# Patient Record
Sex: FEMALE | Race: BLACK OR AFRICAN AMERICAN | Employment: UNEMPLOYED | URBAN - METROPOLITAN AREA
[De-identification: names, ages, dates, MRNs, and addresses within clinical notes are randomized per-mention and may not be internally consistent; named-entity substitution may affect disease eponyms.]

---

## 2017-03-24 ENCOUNTER — TELEPHONE (OUTPATIENT)
Dept: FAMILY MEDICINE CLINIC | Age: 42
End: 2017-03-24

## 2017-03-24 ENCOUNTER — OFFICE VISIT (OUTPATIENT)
Dept: FAMILY MEDICINE CLINIC | Age: 42
End: 2017-03-24

## 2017-03-24 VITALS — BODY MASS INDEX: 30.62 KG/M2 | HEIGHT: 68 IN | WEIGHT: 202 LBS

## 2017-03-24 DIAGNOSIS — E11.9 TYPE 2 DIABETES MELLITUS WITHOUT COMPLICATION, WITHOUT LONG-TERM CURRENT USE OF INSULIN (HCC): ICD-10-CM

## 2017-03-24 DIAGNOSIS — Z01.419 WELL WOMAN EXAM WITH ROUTINE GYNECOLOGICAL EXAM: Primary | ICD-10-CM

## 2017-03-24 DIAGNOSIS — N81.2 INCOMPLETE UTERINE PROLAPSE: ICD-10-CM

## 2017-03-24 NOTE — PROGRESS NOTES
Subjective:   46 y.o. female for Well Woman Check. No LMP recorded. LMP 3/10/17 lasting for 7 days. Regular menses. Denies any vaginal discharge, itching or odor. She does note some urinary incontinence. S/p  x 2-  1. 6 lbs +  2. 6 lbs +  Not currently sexually active for 4 yrs. Denies any concern for STIs. Denies any hx of abnormal pap smears. Social History: not sexually active. Pertinent past medical hstory: hypertension, type 2 DM. Denies any smoking. Denies any drinking or drugs. FHx neg for malignancy. Patient Active Problem List   Diagnosis Code    Allergic rhinitis J30.9    HTN (hypertension) I10    Diabetes mellitus ([facility] E11.9    Obesity E66.9     Patient Active Problem List    Diagnosis Date Noted    Obesity 2016    Allergic rhinitis     HTN (hypertension)     [facility]      Current Outpatient Prescriptions   Medication Sig Dispense Refill    hydroCHLOROthiazide (MICROZIDE) 12.5 mg capsule TAKE ONE CAPSULE BY MOUTH EVERY DAY 30 Cap 0    metFORMIN (GLUCOPHAGE) 500 mg tablet TAKE 1 TABLET BY MOUTH TWICE DAILY WITH MEALS 180 Tab 0    fluticasone (FLONASE) 50 mcg/actuation nasal spray 1 spray in each nostril daily PRn 1 Bottle 0    glucose blood VI test strips (BLOOD GLUCOSE TEST) strip Check blood sugar before breakfast and 2 hours after lunch (heaviest meal of day) 50 Strip 11    naproxen (NAPROSYN) 500 mg tablet Take 1 Tab by mouth two (2) times daily (with meals). 30 Tab 0    Blood-Glucose Meter monitoring kit Check blood sugar before breakfast and 2 hours after lunch (heaviest meal of day)  Reli on prefared 1 Kit 0    cetirizine (ZYRTEC) 5 mg tablet Take 1 Tab by mouth daily. 30 Tab 2    cyclobenzaprine (FLEXERIL) 5 mg tablet Take 1 Tab by mouth three (3) times daily as needed for Muscle Spasm(s).  39 Tab 0     No Known Allergies  Past Medical History:   Diagnosis Date    Allergic rhinitis     Diabetes mellitus ([facility]     HTN (hypertension) Diagnosed in ER in 1/2016 - on HCTZ - checking ambulatory BPs     History reviewed. No pertinent surgical history. Social History   Substance Use Topics    Smoking status: Never Smoker    Smokeless tobacco: Not on file    Alcohol use No        ROS:  Feeling well. No dyspnea or chest pain on exertion. No abdominal pain, change in bowel habits, black or bloody stools. No urinary tract symptoms. GYN ROS: normal menses, no abnormal bleeding, pelvic pain or discharge, no breast pain or new or enlarging lumps on self exam. No neurological complaints. Objective:     Visit Vitals    Ht 5' 8\" (1.727 m)    Wt 202 lb (91.6 kg)    BMI 30.71 kg/m2     The patient appears well, alert, oriented x 3, in no distress. ENT normal.  Neck supple. No adenopathy or thyromegaly. BING. Lungs are clear, good air entry, no wheezes, rhonchi or rales. S1 and S2 normal, no murmurs, regular rate and rhythm. Abdomen soft without tenderness, guarding, mass or organomegaly. Extremities show no edema, normal peripheral pulses. Neurological is normal, no focal findings. BREAST EXAM: breasts appear normal, no suspicious masses, no skin or nipple changes or axillary nodes, symmetric fibrous changes in both upper outer quadrants    PELVIC EXAM: VULVA: normal appearing vulva with no masses, tenderness or lesions, VAGINA: normal appearing vagina with normal color and discharge, no lesions, CERVIX: +uterine prolapse (mod to severe) of a large part of the lower uterine segment and cervix prolapsing through the vaginal opening, UTERUS: uterus is normal size, shape, consistency and nontender, ADNEXA: normal adnexa in size, nontender and no masses, PAP: Pap smear done today, exam chaperoned by LPN    Assessment/Plan:   well woman  pap smear  additional lab tests per orders  return annually or prn    ICD-10-CM ICD-9-CM    1. Well woman exam with routine gynecological exam Z01.419 V72.31 PAP, HPV HIGH RISK      CBC W/O DIFF   2.  Type 2 diabetes mellitus without complication, without long-term current use of insulin ([facility]) C73.6 037.51 METABOLIC PANEL, COMPREHENSIVE      HEMOGLOBIN A1C WITH EAG      LIPID PANEL      AMB POC URINE, MICROALBUMIN, SEMIQUANT (3 RESULTS)       DIABETES FOOT EXAM   3. Incomplete uterine prolapse N81.2 618.2 REFERRAL TO OBSTETRICS AND GYNECOLOGY   .

## 2017-03-24 NOTE — TELEPHONE ENCOUNTER
Patient called to office to report she left paperwork that doctor gave her from her visit in restroom [facility]. Paperwork retrieved by staff member who happened to use that restroom. Informed patient and patient thanked me for my help. Patient ask that I mail her the paperwork she left, which included After Visit Summary/Orders etc.    Verified address on file with patient and patient verified correct.   Address    [address] [facility]  Address     ZIP code    [address] [place] [address]

## 2017-03-24 NOTE — MR AVS SNAPSHOT
Visit Information Date & Time Provider Department Dept. Phone Encounter #  
 3/24/2017  2:00 PM Dickson Moseley, [address] [facility] [phone] [id number] Follow-up Instructions Return in about 6 months (around 9/24/2017). Upcoming Health Maintenance Date Due  
 PAP AKA CERVICAL CYTOLOGY 7/8/2016 FOOT EXAM Q1 2/4/2017 HEMOGLOBIN A1C Q6M 2/28/2017 EYE EXAM RETINAL OR DILATED Q1 10/10/2018* MICROALBUMIN Q1 5/13/2017 LIPID PANEL Q1 8/30/2017 DTaP/Tdap/Td series (2 - Td) 5/13/2026 *Topic was postponed. The date shown is not the original due date. Allergies as of 3/24/2017  Review Complete On: 3/24/2017 By: Dickson Moseley MD  
 No Known Allergies Current Immunizations  Never Reviewed No immunizations on file. Not reviewed this visit You Were Diagnosed With   
  
 Codes Comments Well woman exam with routine gynecological exam    -  Primary ICD-10-CM: L12.242 ICD-9-CM: V72.31 Type 2 diabetes mellitus without complication, without long-term current use of insulin (HCC)     ICD-10-CM: E11.9 ICD-9-CM: 250.00 Incomplete uterine prolapse     ICD-10-CM: N81.2 ICD-9-CM: 618.2 Vitals Height(growth percentile) Weight(growth percentile) BMI OB Status Smoking Status 5' 8\" (1.727 m) 202 lb (91.6 kg) 30.71 kg/m2 Having regular periods Never Smoker BMI and BSA Data Body Mass Index Body Surface Area 30.71 kg/m 2 2.1 m 2 Preferred Pharmacy Pharmacy Name Phone [facility] [phone] [facility] [place], [address] [facility] [phone] Your Updated Medication List  
  
   
This list is accurate as of: 3/24/17  2:48 PM.  Always use your most recent med list.  
  
  
  
  
 Blood-Glucose Meter monitoring kit Check blood sugar before breakfast and 2 hours after lunch (heaviest meal of day) Reli on prefared  
  
 cetirizine 5 mg tablet Commonly known as:  ZYRTEC Take 1 Tab by mouth daily. cyclobenzaprine 5 mg tablet Commonly known as:  FLEXERIL Take 1 Tab by mouth three (3) times daily as needed for Muscle Spasm(s). fluticasone 50 mcg/actuation nasal spray Commonly known as:  FLONASE  
1 spray in each nostril daily PRn  
  
 glucose blood VI test strips strip Commonly known as:  blood glucose test  
Check blood sugar before breakfast and 2 hours after lunch (heaviest meal of day)  
  
 hydroCHLOROthiazide 12.5 mg capsule Commonly known as:  Ferdie Cea TAKE ONE CAPSULE BY MOUTH EVERY DAY  
  
 metFORMIN 500 mg tablet Commonly known as:  GLUCOPHAGE  
TAKE 1 TABLET BY MOUTH TWICE DAILY WITH MEALS  
  
 naproxen 500 mg tablet Commonly known as:  NAPROSYN Take 1 Tab by mouth two (2) times daily (with meals). We Performed the Following  DIABETES FOOT EXAM [7 Custom] PAP, HPV HIGH RISK [88124 CPT(R)] REFERRAL TO OBSTETRICS AND GYNECOLOGY [REF51 Custom] Comments:  
 Please evaluate patient for uterine prolapse, incomplete. Follow-up Instructions Return in about 6 months (around 9/24/2017). To-Do List   
 03/30/2017 Point of Care Testing:  AMB POC URINE, MICROALBUMIN, SEMIQUANT (3 RESULTS)   
  
 03/30/2017 Lab:  CBC W/O DIFF   
  
 03/30/2017 Lab:  HEMOGLOBIN A1C WITH EAG   
  
 03/30/2017 Lab:  LIPID PANEL   
  
 03/30/2017 Lab:  METABOLIC PANEL, COMPREHENSIVE Referral Information Referral ID Referred By Referred To  
  
 [facility], Soni Ellison MD   
   [address] [place] [address] Phone: [phone] Fax: [phone] Visits Status Start Date End Date 1 New Request 3/24/17 3/24/18 If your referral has a status of pending review or denied, additional information will be sent to support the outcome of this decision. Patient Instructions A Healthy Heart: Care Instructions Your Care Instructions Heart disease occurs when a substance called plaque builds up in the vessels that supply oxygen-rich blood to your heart. This can narrow the blood vessels and reduce blood flow. A heart attack happens when blood flow is completely blocked. A high-fat diet, smoking, and other factors increase the risk of heart disease. Your doctor has found that you have a chance of having heart disease. You can do lots of things to keep your heart healthy. It may not be easy, but you can change your diet, exercise more, and quit smoking. These steps really work to lower your chance of heart disease. Follow-up care is a key part of your treatment and safety. Be sure to make and go to all appointments, and call your doctor if you are having problems. It's also a good idea to know your test results and keep a list of the medicines you take. How can you care for yourself at home? Diet · Use less salt when you cook and eat. This helps lower your blood pressure. Taste food before salting. Add only a little salt when you think you need it. With time, your taste buds will adjust to less salt. · Eat fewer snack items, fast foods, canned soups, and other high-salt, high-fat, processed foods. · Read food labels and try to avoid saturated and trans fats. They increase your risk of heart disease by raising cholesterol levels. · Limit the amount of solid fat-butter, margarine, and shortening-you eat. Use olive, peanut, or canola oil when you cook. Bake, broil, and steam foods instead of frying them. · Eating fish can lower your risk for heart disease. Eat at least 2 servings of fish a week. Macy, mackerel, herring, sardines, and chunk light tuna are very good choices. These fish contain omega-3 fatty acids. · Eat a variety of fruit and vegetables every day.  Dark green, deep orange, red, or yellow fruits and vegetables are especially good for you. Examples include spinach, carrots, peaches, and berries. · Foods high in fiber can reduce your cholesterol and provide important vitamins and minerals. High-fiber foods include whole-grain cereals and breads, oatmeal, beans, brown rice, citrus fruits, and apples. · Limit drinks and foods with added sugar. These include candy, desserts, and soda pop. Lifestyle changes · If your doctor recommends it, get more exercise. Walking is a good choice. Bit by bit, increase the amount you walk every day. Try for at least 30 minutes on most days of the week. You also may want to swim, bike, or do other activities. · Do not smoke. If you need help quitting, talk to your doctor about stop-smoking programs and medicines. These can increase your chances of quitting for good. Quitting smoking may be the most important step you can take to protect your heart. It is never too late to quit. You will get health benefits right away. · Limit alcohol to 2 drinks a day for men and 1 drink a day for women. Too much alcohol can cause health problems. Medicines · Take your medicines exactly as prescribed. Call your doctor if you think you are having a problem with your medicine. · If your doctor recommends aspirin, take the amount directed each day. Make sure you take aspirin and not another kind of pain reliever, such as acetaminophen (Tylenol). If you take ibuprofen (such as Advil or Motrin) for other problems, take aspirin at least 2 hours before taking ibuprofen. When should you call for help? Call 911 if you have symptoms of a heart attack. These may include: 
· You have chest pain or pressure. This may occur with: 
¨ Chest pain or pressure, or a strange feeling in the chest. 
¨ Sweating. ¨ Shortness of breath. ¨ Pain, pressure, or a strange feeling in the back, neck, jaw, or upper belly or in one or both shoulders or arms. ¨ Lightheadedness or sudden weakness. ¨ A fast or irregular heartbeat. After you call 911, the  may tell you to chew 1 adult-strength or 2 to 4 low-dose aspirin. Wait for an ambulance. Do not try to drive yourself. Watch closely for changes in your health, and be sure to contact your doctor if: 
· Your symptoms are slowly getting worse. · You do not get better as expected. Where can you learn more? Go to http://shayla-michael.info/. Enter K903 in the search box to learn more about \"A Healthy Heart: Care Instructions. \" Current as of: January 27, 2016 Content Version: 11.1 © 7299-0210 [employer]. Care instructions adapted under license by [employer] (which disclaims liability or warranty for this information). If you have questions about a medical condition or this instruction, always ask your healthcare professional. [place] [address] any warranty or liability for your use of this information. Heart-Healthy Diet: Care Instructions Your Care Instructions A heart-healthy diet has lots of vegetables, fruits, nuts, beans, and whole grains, and is low in salt. It limits foods that are high in saturated fat, such as meats, cheeses, and fried foods. It may be hard to change your diet, but even small changes can lower your risk of heart attack and heart disease. Follow-up care is a key part of your treatment and safety. Be sure to make and go to all appointments, and call your doctor if you are having problems. It's also a good idea to know your test results and keep a list of the medicines you take. How can you care for yourself at home? Watch your portions · Learn what a serving is. A \"serving\" and a \"portion\" are not always the same thing. Make sure that you are not eating larger portions than are recommended. For example, a serving of pasta is ½ cup.  A serving size of meat is 2 to 3 ounces. A 3-ounce serving is about the size of a deck of cards. Measure serving sizes until you are good at [place]" them. Keep in mind that restaurants often serve portions that are 2 or 3 times the size of one serving. · To keep your energy level up and keep you from feeling hungry, eat often but in smaller portions. · Eat only the number of calories you need to stay at a healthy weight. If you need to lose weight, eat fewer calories than your body burns (through exercise and other physical activity). Eat more fruits and vegetables · Eat a variety of fruit and vegetables every day. Dark green, deep orange, red, or yellow fruits and vegetables are especially good for you. Examples include spinach, carrots, peaches, and berries. · Keep carrots, celery, and other veggies handy for snacks. Buy fruit that is in season and store it where you can see it so that you will be tempted to eat it. · Cook dishes that have a lot of veggies in them, such as stir-fries and soups. Limit saturated and trans fat · Read food labels, and try to avoid saturated and trans fats. They increase your risk of heart disease. Trans fat is found in many processed foods such as cookies and crackers. · Use olive or canola oil when you cook. Try cholesterol-lowering spreads, such as Benecol or Take Control. · Bake, broil, grill, or steam foods instead of frying them. · Choose lean meats instead of high-fat meats such as hot dogs and sausages. Cut off all visible fat when you prepare meat. · Eat fish, skinless poultry, and meat alternatives such as soy products instead of high-fat meats. Soy products, such as tofu, may be especially good for your heart. · Choose low-fat or fat-free milk and dairy products. Eat fish · Eat at least two servings of fish a week. Certain fish, such as salmon and tuna, contain omega-3 fatty acids, which may help reduce your risk of heart attack. Eat foods high in fiber · Eat a variety of grain products every day. Include whole-grain foods that have lots of fiber and nutrients. Examples of whole-grain foods include oats, whole wheat bread, and brown rice. · Buy whole-grain breads and cereals, instead of white bread or pastries. Limit salt and sodium · Limit how much salt and sodium you eat to help lower your blood pressure. · Taste food before you salt it. Add only a little salt when you think you need it. With time, your taste buds will adjust to less salt. · Eat fewer snack items, fast foods, and other high-salt, processed foods. Check food labels for the amount of sodium in packaged foods. · Choose low-sodium versions of canned goods (such as soups, vegetables, and beans). Limit sugar · Limit drinks and foods with added sugar. These include candy, desserts, and soda pop. Limit alcohol · Limit alcohol to no more than 2 drinks a day for men and 1 drink a day for women. Too much alcohol can cause health problems. When should you call for help? Watch closely for changes in your health, and be sure to contact your doctor if: 
· You would like help planning heart-healthy meals. Where can you learn more? Go to http://shayla-michael.info/. Enter V137 in the search box to learn more about \"Heart-Healthy Diet: Care Instructions. \" Current as of: January 27, 2016 Content Version: 11.1 © 4440-5917 [employer]. Care instructions adapted under license by [employer] (which disclaims liability or warranty for this information). If you have questions about a medical condition or this instruction, always ask your healthcare professional. [place] [address] any warranty or liability for your use of this information. Eating Healthy Foods: Care Instructions Your Care Instructions Eating healthy foods can help lower your risk for disease.  Healthy food gives you energy and keeps your heart strong, your brain active, your muscles working, and your bones strong. A healthy diet includes a variety of foods from the basic food groups: grains, vegetables, fruits, milk and milk products, and meat and beans. Some people may eat more of their favorite foods from only one food group and, as a result, miss getting the nutrients they need. So, it is important to pay attention not only to what you eat but also to what you are missing from your diet. You can eat a healthy, balanced diet by making a few small changes. Follow-up care is a key part of your treatment and safety. Be sure to make and go to all appointments, and call your doctor if you are having problems. Its also a good idea to know your test results and keep a list of the medicines you take. How can you care for yourself at home? Look at what you eat · Keep a food diary for a week or two and record everything you eat or drink. Track the number of servings you eat from each food group. · For a balanced diet every day, eat a variety of: ¨ 6 or more ounce-equivalents of grains, such as cereals, breads, crackers, rice, or pasta, every day. An ounce-equivalent is 1 slice of bread, 1 cup of ready-to-eat cereal, or ½ cup of cooked rice, cooked pasta, or cooked cereal. 
¨ 2½ cups of vegetables, especially: § Dark-green vegetables such as broccoli and spinach. § Orange vegetables such as carrots and sweet potatoes. § Dry beans (such as suarez and kidney beans) and peas (such as lentils). ¨ 2 cups of fresh, frozen, or canned fruit. A small apple or 1 banana or orange equals 1 cup. ¨ 3 cups of nonfat or low-fat milk, yogurt, or other milk products. ¨ 5½ ounces of meat and beans, such as chicken, fish, lean meat, beans, nuts, and seeds. One egg, 1 tablespoon of peanut butter, ½ ounce nuts or seeds, or ¼ cup of cooked beans equals 1 ounce of meat. · Learn how to read food labels for serving sizes and ingredients. Fast-food and convenience-food meals often contain few or no fruits or vegetables. Make sure you eat some fruits and vegetables to make the meal more nutritious. · Look at your food diary. For each food group, add up what you have eaten and then divide the total by the number of days. This will give you an idea of how much you are eating from each food group. See if you can find some ways to change your diet to make it more healthy. Start small · Do not try to make dramatic changes to your diet all at once. You might feel that you are missing out on your favorite foods and then be more likely to fail. · Start slowly, and gradually change your habits. Try some of the following: ¨ Use whole wheat bread instead of white bread. ¨ Use nonfat or low-fat milk instead of whole milk. ¨ Eat brown rice instead of white rice, and eat whole wheat pasta instead of white-flour pasta. ¨ Try low-fat cheeses and low-fat yogurt. ¨ Add more fruits and vegetables to meals and have them for snacks. ¨ Add lettuce, tomato, cucumber, and onion to sandwiches. ¨ Add fruit to yogurt and cereal. 
Enjoy food · You can still eat your favorite foods. You just may need to eat less of them. If your favorite foods are high in fat, salt, and sugar, limit how often you eat them, but do not cut them out entirely. · Eat a wide variety of foods. Make healthy choices when eating out · The type of restaurant you choose can help you make healthy choices. Even fast-food chains are now offering more low-fat or healthier choices on the menu. · Choose smaller portions, or take half of your meal home. · When eating out, try: ¨ A veggie pizza with a whole wheat crust or grilled chicken (instead of sausage or pepperoni). ¨ Pasta with roasted vegetables, grilled chicken, or marinara sauce instead of cream sauce. ¨ A vegetable wrap or grilled chicken wrap. ¨ Broiled or poached food instead of fried or breaded items. Make healthy choices easy · Buy packaged, prewashed, ready-to-eat fresh vegetables and fruits, such as baby carrots, salad mixes, and chopped or shredded broccoli and cauliflower. · Buy packaged, presliced fruits, such as melon or pineapple. · Choose 100% fruit or vegetable juice instead of soda. Limit juice intake to 4 to 6 oz (½ to ¾ cup) a day. · Blend low-fat yogurt, fruit juice, and canned or frozen fruit to make a smoothie for breakfast or a snack. Where can you learn more? Go to http://shaylaicanbuymichael.info/. Enter T756 in the search box to learn more about \"Eating Healthy Foods: Care Instructions. \" Current as of: November 20, 2015 Content Version: 11.1 © 6372-0458 [employer]. Care instructions adapted under license by [employer] (which disclaims liability or warranty for this information). If you have questions about a medical condition or this instruction, always ask your healthcare professional. [place] [address] any warranty or liability for your use of this information. Nutrition Tips for Diabetes: After Your Visit Your Care Instructions A healthy diet is important to manage diabetes. It helps you lose weight (if you need to) and keep it off. It gives you the nutrition and energy your body needs and helps prevent heart disease. But a diet for diabetes does not mean that you have to eat special foods. You can eat what your family eats, including occasional sweets and other favorites. But you do have to pay attention to how often you eat and how much you eat of certain foods. The right plan for you will give you meals that help you keep your blood sugar at healthy levels. Try to eat a variety of foods and to spread carbohydrate throughout the day. Carbohydrate raises blood sugar higher and more quickly than any other nutrient does. Carbohydrate is found in sugar, breads and cereals, fruit, starchy vegetables such as potatoes and corn, and milk and yogurt. You may want to work with a dietitian or diabetes educator to help you plan meals and snacks. A dietitian or diabetes educator also can help you lose weight if that is one of your goals. The following tips can help you enjoy your meals and stay healthy. Follow-up care is a key part of your treatment and safety. Be sure to make and go to all appointments, and call your doctor if you are having problems. Its also a good idea to know your test results and keep a list of the medicines you take. How can you care for yourself at home? · Learn which foods have carbohydrate and how much carbohydrate to eat. A dietitian or diabetes educator can help you learn to keep track of how much carbohydrate you eat. · Spread carbohydrate throughout the day. Eat some carbohydrate at all meals, but do not eat too much at any one time. · Plan meals to include food from all the food groups. These are the food groups and some example portion sizes: ¨ Grains: 1 slice of bread (1 ounce), ½ cup of cooked cereal, and 1/3 cup of cooked pasta or rice. These have about 15 grams of carbohydrate in a serving. Choose whole grains such as whole wheat bread or crackers, oatmeal, and brown rice more often than refined grains. ¨ Fruit: 1 small fresh fruit, such as an apple or orange; ½ of a banana; ½ cup of chopped, cooked, or canned fruit; ½ cup of fruit juice; 1 cup of melon or raspberries; and 2 tablespoons of dried fruit. These have about 15 grams of carbohydrate in a serving. ¨ Dairy: 1 cup of nonfat or low-fat milk and 2/3 cup of plain yogurt. These have about 15 grams of carbohydrate in a serving. ¨ Protein foods: Beef, chicken, turkey, fish, eggs, tofu, cheese, cottage cheese, and peanut butter. A serving size of meat is 3 ounces, which is about the size of a deck of cards.  Examples of meat substitute serving sizes (equal to 1 ounce of meat) are 1/4 cup of cottage cheese, 1 egg, 1 tablespoon of peanut butter, and ½ cup of tofu. These have very little or no carbohydrate per serving. ¨ Vegetables: Starchy vegetables such as ½ cup of cooked dried beans, peas, potatoes, or corn have about 15 grams of carbohydrate. Nonstarchy vegetables have very little carbohydrate, such as 1 cup of raw leafy vegetables (such as spinach), ½ cup of other vegetables (cooked or chopped), and 3/4 cup of vegetable juice. · Use the plate format to plan meals. It is a good, quick way to make sure that you have a balanced meal. It also helps you spread carbohydrate throughout the day. You divide your plate by types of foods. Put vegetables on half the plate, meat or meat substitutes on one-quarter of the plate, and a grain or starchy vegetable (such as brown rice or a potato) in the final quarter of the plate. To this you can add a small piece of fruit and 1 cup of milk or yogurt, depending on how much carbohydrate you are supposed to eat at a meal. 
· Talk to your dietitian or diabetes educator about ways to add limited amounts of sweets into your meal plan. You can eat these foods now and then, as long as you include the amount of carbohydrate they have in your daily carbohydrate allowance. · If you drink alcohol, limit it to no more than 1 drink a day for women and 2 drinks a day for men. If you are pregnant, no amount of alcohol is known to be safe. · Protein, fat, and fiber do not raise blood sugar as much as carbohydrate does. If you eat a lot of these nutrients in a meal, your blood sugar will rise more slowly than it would otherwise. · Limit saturated fats, such as those from meat and dairy products. Try to replace it with monounsaturated fat, such as olive oil. This is a healthier choice because people who have diabetes are at higher-than-average risk of heart disease. But use a modest amount of olive oil. A tablespoon of olive oil has 14 grams of fat and 120 calories. · Exercise lowers blood sugar. If you take insulin by shots or pump, you can use less than you would if you were not exercising. Keep in mind that timing matters. If you exercise within 1 hour after a meal, your body may need less insulin for that meal than it would if you exercised 3 hours after the meal. Test your blood sugar to find out how exercise affects your need for insulin. · Exercise on most days of the week. Aim for at least 30 minutes. Exercise helps you stay at a healthy weight and helps your body use insulin. Walking is an easy way to get exercise. Gradually increase the amount you walk every day. You also may want to swim, bike, or do other activities. When you eat out · Learn to estimate the serving sizes of foods that have carbohydrate. If you measure food at home, it will be easier to estimate the amount in a serving of restaurant food. · If the meal you order has too much carbohydrate (such as potatoes, corn, or baked beans), ask to have a low-carbohydrate food instead. Ask for a salad or green vegetables. · If you use insulin, check your blood sugar before and after eating out to help you plan how much to eat in the future. · If you eat more carbohydrate at a meal than you had planned, take a walk or do other exercise. This will help lower your blood sugar. Where can you learn more? Go to Bloomspot.be Enter A818 in the search box to learn more about \"Nutrition Tips for Diabetes: After Your Visit. \"  
© 6904-1573 Healthwise, [employer]orporated. Care instructions adapted under license by Mark Waggoner (which disclaims liability or warranty for this information). This care instruction is for use with your licensed healthcare professional. If you have questions about a medical condition or this instruction, always ask your healthcare professional. [address] any warranty or liability for your use of this information. Content Version: [id number]; Current as of: June 4, 2014 Well Visit, Ages 25 to 48: Care Instructions Your Care Instructions Physical exams can help you stay healthy. Your doctor has checked your overall health and may have suggested ways to take good care of yourself. He or she also may have recommended tests. At home, you can help prevent illness with healthy eating, regular exercise, and other steps. Follow-up care is a key part of your treatment and safety. Be sure to make and go to all appointments, and call your doctor if you are having problems. It's also a good idea to know your test results and keep a list of the medicines you take. How can you care for yourself at home? · Reach and stay at a healthy weight. This will lower your risk for many problems, such as obesity, diabetes, heart disease, and high blood pressure. · Get at least 30 minutes of physical activity on most days of the week. Walking is a good choice. You also may want to do other activities, such as running, swimming, cycling, or playing tennis or team sports. Discuss any changes in your exercise program with your doctor. · Do not smoke or allow others to smoke around you. If you need help quitting, talk to your doctor about stop-smoking programs and medicines. These can increase your chances of quitting for good. · Talk to your doctor about whether you have any risk factors for sexually transmitted infections (STIs). Having one sex partner (who does not have STIs and does not have sex with anyone else) is a good way to avoid these infections. · Use birth control if you do not want to have children at this time. Talk with your doctor about the choices available and what might be best for you. · Protect your skin from too much sun. When you're outdoors from 10 a.m. to 4 p.m., stay in the shade or cover up with clothing and a hat with a wide brim. Wear sunglasses that block UV rays.  Even when it's cloudy, put broad-spectrum sunscreen (SPF 30 or higher) on any exposed skin. · See a dentist one or two times a year for checkups and to have your teeth cleaned. · Wear a seat belt in the car. · Drink alcohol in moderation, if at all. That means no more than 2 drinks a day for men and 1 drink a day for women. Follow your doctor's advice about when to have certain tests. These tests can spot problems early. For everyone · Cholesterol. Have the fat (cholesterol) in your blood tested after age 21. Your doctor will tell you how often to have this done based on your age, family history, or other things that can increase your risk for heart disease. · Blood pressure. Have your blood pressure checked during a routine doctor visit. Your doctor will tell you how often to check your blood pressure based on your age, your blood pressure results, and other factors. · Vision. Talk with your doctor about how often to have a glaucoma test. 
· Diabetes. Ask your doctor whether you should have tests for diabetes. · Colon cancer. Have a test for colon cancer at age 48. You may have one of several tests. If you are younger than 48, you may need a test earlier if you have any risk factors. Risk factors include whether you already had a precancerous polyp removed from your colon or whether your parent, brother, sister, or child has had colon cancer. For women · Breast exam and mammogram. Talk to your doctor about when you should have a clinical breast exam and a mammogram. Medical experts differ on whether and how often women under 50 should have these tests. Your doctor can help you decide what is right for you. · Pap test and pelvic exam. Begin Pap tests at age 24. A Pap test is the best way to find cervical cancer. The test often is part of a pelvic exam. Ask how often to have this test. 
· Tests for sexually transmitted infections (STIs).  Ask whether you should have tests for STIs. You may be at risk if you have sex with more than one person, especially if your partners do not wear condoms. For men · Tests for sexually transmitted infections (STIs). Ask whether you should have tests for STIs. You may be at risk if you have sex with more than one person, especially if you do not wear a condom. · Testicular cancer exam. Ask your doctor whether you should check your testicles regularly. · Prostate exam. Talk to your doctor about whether you should have a blood test (called a PSA test) for prostate cancer. Experts differ on whether and when men should have this test. Some experts suggest it if you are older than 39 and are -American or have a father or brother who got prostate cancer when he was younger than 72. When should you call for help? Watch closely for changes in your health, and be sure to contact your doctor if you have any problems or symptoms that concern you. Where can you learn more? Go to http://shayla-michael.info/. Enter P072 in the search box to learn more about \"Well Visit, Ages 25 to 48: Care Instructions. \" Current as of: July 19, 2016 Content Version: 11.1 © 6443-3719 [employer]. Care instructions adapted under license by [employer] (which disclaims liability or warranty for this information). If you have questions about a medical condition or this instruction, always ask your healthcare professional. [address] any warranty or liability for your use of this information. Pelvic Prolapse: Before Your Surgery What is surgery for pelvic prolapse? Your pelvic muscles hold your pelvic organs in place. If they become weak, your uterus, bowel, or bladder may press against your vagina. This is called a pelvic prolapse. Surgery puts your organ back in place. It also adds support to your muscles. You will be asleep during the surgery. You will not feel pain.  The doctor can do the surgery in two ways. In open surgery, the doctor makes a cut in your belly or inside the vagina. The cut is called an incision. In laparoscopic surgery, the doctor puts a lighted tube and other surgical tools through small incisions near your belly button and groin. This tube is called a scope. It lets the doctor see your organs. If you have open surgery, you will go home in 1 to 4 days. It usually takes about 4 to 6 weeks to fully recover. If you have laparoscopic surgery, you will go home in 1 or 2 days. It usually takes 1 to 2 weeks to fully recover. At home, you may need to wear a catheter. This is a tube in your bladder. It carries urine out of your body. After surgery, you may have less pain during sex. The surgery may also help with any bladder or bowel problems you may have had. If you still have your uterus, your ability to have children will not be affected. Follow-up care is a key part of your treatment and safety. Be sure to make and go to all appointments, and call your doctor if you are having problems. It's also a good idea to know your test results and keep a list of the medicines you take. What happens before surgery? Surgery can be stressful. This information will help you understand what you can expect. And it will help you safely prepare for surgery. Preparing for surgery · Understand exactly what surgery is planned, along with the risks, benefits, and other options. · Tell your doctors ALL the medicines, vitamins, supplements, and herbal remedies you take. Some of these can increase the risk of bleeding or interact with anesthesia. · If you take blood thinners, such as warfarin (Coumadin), clopidogrel (Plavix), or aspirin, be sure to talk to your doctor. He or she will tell you if you should stop taking these medicines before your surgery. Make sure that you understand exactly what your doctor wants you to do. · Your doctor will tell you which medicines to take or stop before your surgery. You may need to stop taking certain medicines a week or more before surgery. So talk to your doctor as soon as you can. · If you have an advance directive, let your doctor know. It may include a living will and a durable power of  for health care. Bring a copy to the hospital. If you don't have one, you may want to prepare one. It lets your doctor and loved ones know your health care wishes. Doctors advise that everyone prepare these papers before any type of surgery or procedure. · You may need to take a laxative or enema before surgery. Your doctor will tell you how to do this. What happens on the day of surgery? · Follow the instructions exactly about when to stop eating and drinking. If you don't, your surgery may be canceled. If your doctor told you to take your medicines on the day of surgery, take them with only a sip of water. · Take a bath or shower before you come in for your surgery. Do not apply lotions, perfumes, deodorants, or nail polish. · Do not shave the surgical site yourself. · Take off all jewelry and piercings. And take out contact lenses, if you wear them. At the hospital or surgery center · Bring a picture ID. · The area for surgery is often marked to make sure there are no errors. · You will be kept comfortable and safe by your anesthesia provider. You will be asleep during the surgery. · The surgery will take 45 minutes to 2 hours. Going home · Be sure you have someone to drive you home. Anesthesia and pain medicine make it unsafe for you to drive. · You will be given more specific instructions about recovering from your surgery. They will cover things like diet, wound care, follow-up care, driving, and getting back to your normal routine. When should you call your doctor? · You have questions or concerns. · You don't understand how to prepare for your surgery. · You become ill before the surgery (such as fever, flu, or a cold). · You need to reschedule or have changed your mind about having the surgery. Where can you learn more? Go to http://shayla-michael.info/. Enter O600 in the search box to learn more about \"Pelvic Prolapse: Before Your Surgery. \" Current as of: February 25, 2016 Content Version: 11.1 © 7132-1894 [employer]. Care instructions adapted under license by [employer] (which disclaims liability or warranty for this information). If you have questions about a medical condition or this instruction, always ask your healthcare professional. [address] any warranty or liability for your use of this information. Uterine Prolapse: Care Instructions Your Care Instructions When the uterus moves down in the pelvis and starts to press into the vagina, it is called uterine prolapse. This happens when the pelvic muscles and tissues get weak. Women often have this problem after they have children or when they get older. It can also happen if you are overweight or you have a long-term cough. These put extra pressure on the uterus. This problem may cause you to leak urine. Or you may have trouble passing urine or stool. You may feel pain during sex. But in most cases, prolapse doesn't cause more serious health problems. You may feel better if you change how you do some of your daily activities. And you can try exercises to make your pelvic muscles strong. But if these don't help, you may want to talk with your doctor about surgery. Follow-up care is a key part of your treatment and safety. Be sure to make and go to all appointments, and call your doctor if you are having problems. It's also a good idea to know your test results and keep a list of the medicines you take. How can you care for yourself at home? · Do not do activities that put pressure on your pelvic muscles.  This includes heavy lifting and straining. · Do exercises to tighten and strengthen your pelvic muscles. These are called Kegel exercises. To do them: 
¨ Squeeze the muscles you use to stop urine. Your belly and thighs should not move. ¨ Hold the squeeze for 3 seconds. Then relax for 3 seconds. ¨ Start with 3 seconds. Then add 1 second each week until you are able to squeeze for 10 seconds. ¨ Repeat this 10 to 15 times. Do these exercises 3 or more times a day. · Take an over-the-counter pain medicine, such as acetaminophen (Tylenol), ibuprofen (Advil, Motrin), or naproxen (Aleve), to relieve pain. Read and follow all instructions on the label. · Do not take two or more pain medicines at the same time unless the doctor told you to. Many pain medicines have acetaminophen, which is Tylenol. Too much acetaminophen (Tylenol) can be harmful. · Talk with your doctor about a vaginal pessary. This is a device that you put in your vagina to support the uterus. Your doctor can teach you how and when to remove it. You will also learn how to clean it and put it back in. 
· If your doctor prescribes estrogen cream for your vagina, use it exactly as prescribed. · To relieve pressure on your vagina, lie down and put a pillow under your knees. Or you can lie on your side and bring your knees up to your chest. 
· If you are overweight, talk to your doctor about safe ways to lose weight. When should you call for help? Call your doctor now or seek immediate medical care if: 
· You have new urinary symptoms, such as pain when urinating or urinating often. · You have pain in your lower back or belly. Watch closely for changes in your health, and be sure to contact your doctor if: 
· You feel something bulge outside of your vagina. · You have irregular vaginal bleeding. · You have a new vaginal discharge. · You are leaking urine. · Your symptoms keep you from doing your daily activities. · You have pain during sex. Where can you learn more? Go to http://shayla-michael.info/. Enter Q808 in the search box to learn more about \"Uterine Prolapse: Care Instructions. \" Current as of: February 25, 2016 Content Version: 11.1 © 1931-4038 [employer]. Care instructions adapted under license by [employer] (which disclaims liability or warranty for this information). If you have questions about a medical condition or this instruction, always ask your healthcare professional. [address] any warranty or liability for your use of this information. Introducing [facility]! Radha Cabrera introduces [employer] patient portal. Now you can access parts of your medical record, email your doctor's office, and request medication refills online. 1. In your internet browser, go to [email or web address] 2. Click on the First Time User? Click Here link in the Sign In box. You will see the New Member Sign Up page. 3. Enter your [employer]il Access Code exactly as it appears below. You will not need to use this code after youve completed the sign-up process. If you do not sign up before the expiration date, you must request a new code. · [employer] Access Code: [email or web address] Expires: 6/22/2017  2:30 PM 
 
4. Enter the last four digits of your Social Security Number (xxxx) and Date of Birth (mm/dd/yyyy) as indicated and click Submit. You will be taken to the next sign-up page. 5. Create a [employer]il ID. This will be your [employer] login ID and cannot be changed, so think of one that is secure and easy to remember. 6. Create a [employer] password. You can change your password at any time. 7. Enter your Password Reset Question and Answer. This can be used at a later time if you forget your password. 8. Enter your e-mail address. You will receive e-mail notification when new information is available in [address] 9. Click Sign Up. You can now view and download portions of your medical record. 10. Click the Download Summary menu link to download a portable copy of your medical information. If you have questions, please visit the Frequently Asked Questions section of the [employer] website. Remember, [employer] is NOT to be used for urgent needs. For medical emergencies, dial 911. Now available from your iPhone and Android! Please provide this summary of care documentation to your next provider. Your primary care clinician is listed as Christianne Chilel. If you have any questions after today's visit, please call [phone].

## 2017-03-24 NOTE — PATIENT INSTRUCTIONS
A Healthy Heart: Care Instructions  Your Care Instructions    Heart disease occurs when a substance called plaque builds up in the vessels that supply oxygen-rich blood to your heart. This can narrow the blood vessels and reduce blood flow. A heart attack happens when blood flow is completely blocked. A high-fat diet, smoking, and other factors increase the risk of heart disease. Your doctor has found that you have a chance of having heart disease. You can do lots of things to keep your heart healthy. It may not be easy, but you can change your diet, exercise more, and quit smoking. These steps really work to lower your chance of heart disease. Follow-up care is a key part of your treatment and safety. Be sure to make and go to all appointments, and call your doctor if you are having problems. It's also a good idea to know your test results and keep a list of the medicines you take. How can you care for yourself at home? Diet  · Use less salt when you cook and eat. This helps lower your blood pressure. Taste food before salting. Add only a little salt when you think you need it. With time, your taste buds will adjust to less salt. · Eat fewer snack items, fast foods, canned soups, and other high-salt, high-fat, processed foods. · Read food labels and try to avoid saturated and trans fats. They increase your risk of heart disease by raising cholesterol levels. · Limit the amount of solid fat-butter, margarine, and shortening-you eat. Use olive, peanut, or canola oil when you cook. Bake, broil, and steam foods instead of frying them. · Eating fish can lower your risk for heart disease. Eat at least 2 servings of fish a week. [place], mackerel, herring, sardines, and chunk light tuna are very good choices. These fish contain omega-3 fatty acids. · Eat a variety of fruit and vegetables every day. Dark green, deep orange, red, or yellow fruits and vegetables are especially good for you.  Examples include spinach, carrots, peaches, and berries. · Foods high in fiber can reduce your cholesterol and provide important vitamins and minerals. High-fiber foods include whole-grain cereals and breads, oatmeal, beans, brown rice, citrus fruits, and apples. · Limit drinks and foods with added sugar. These include candy, desserts, and soda pop. Lifestyle changes  · If your doctor recommends it, get more exercise. Walking is a good choice. Bit by bit, increase the amount you walk every day. Try for at least 30 minutes on most days of the week. You also may want to swim, bike, or do other activities. · Do not smoke. If you need help quitting, talk to your doctor about stop-smoking programs and medicines. These can increase your chances of quitting for good. Quitting smoking may be the most important step you can take to protect your heart. It is never too late to quit. You will get health benefits right away. · Limit alcohol to 2 drinks a day for men and 1 drink a day for women. Too much alcohol can cause health problems. Medicines  · Take your medicines exactly as prescribed. Call your doctor if you think you are having a problem with your medicine. · If your doctor recommends aspirin, take the amount directed each day. Make sure you take aspirin and not another kind of pain reliever, such as acetaminophen (Tylenol). If you take ibuprofen (such as Advil or Motrin) for other problems, take aspirin at least 2 hours before taking ibuprofen. When should you call for help? Call 911 if you have symptoms of a heart attack. These may include:  · You have chest pain or pressure. This may occur with:  ¨ Chest pain or pressure, or a strange feeling in the chest.  ¨ Sweating. ¨ Shortness of breath. ¨ Pain, pressure, or a strange feeling in the back, neck, jaw, or upper belly or in one or both shoulders or arms. ¨ Lightheadedness or sudden weakness. ¨ A fast or irregular heartbeat.   After you call 911, the  may tell you to chew 1 adult-strength or 2 to 4 low-dose aspirin. Wait for an ambulance. Do not try to drive yourself. Watch closely for changes in your health, and be sure to contact your doctor if:  · Your symptoms are slowly getting worse. · You do not get better as expected. Where can you learn more? Go to http://shayla-michael.info/. Enter X534 in the search box to learn more about \"A Healthy Heart: Care Instructions. \"  Current as of: January 27, 2016  Content Version: 11.1  © 2296-2539 [employer]. Care instructions adapted under license by [employer] (which disclaims liability or warranty for this information). If you have questions about a medical condition or this instruction, always ask your healthcare professional. [address] any warranty or liability for your use of this information. Heart-Healthy Diet: Care Instructions  Your Care Instructions    A heart-healthy diet has lots of vegetables, fruits, nuts, beans, and whole grains, and is low in salt. It limits foods that are high in saturated fat, such as meats, cheeses, and fried foods. It may be hard to change your diet, but even small changes can lower your risk of heart attack and heart disease. Follow-up care is a key part of your treatment and safety. Be sure to make and go to all appointments, and call your doctor if you are having problems. It's also a good idea to know your test results and keep a list of the medicines you take. How can you care for yourself at home? Watch your portions  · Learn what a serving is. A \"serving\" and a \"portion\" are not always the same thing. Make sure that you are not eating larger portions than are recommended. For example, a serving of pasta is ½ cup. A serving size of meat is 2 to 3 ounces. A 3-ounce serving is about the size of a deck of cards. Measure serving sizes until you are good at [place]" them.  Keep in mind that restaurants often serve portions that are 2 or 3 times the size of one serving. · To keep your energy level up and keep you from feeling hungry, eat often but in smaller portions. · Eat only the number of calories you need to stay at a healthy weight. If you need to lose weight, eat fewer calories than your body burns (through exercise and other physical activity). Eat more fruits and vegetables  · Eat a variety of fruit and vegetables every day. Dark green, deep orange, red, or yellow fruits and vegetables are especially good for you. Examples include spinach, carrots, peaches, and berries. · Keep carrots, celery, and other veggies handy for snacks. Buy fruit that is in season and store it where you can see it so that you will be tempted to eat it. · Cook dishes that have a lot of veggies in them, such as stir-fries and soups. Limit saturated and trans fat  · Read food labels, and try to avoid saturated and trans fats. They increase your risk of heart disease. Trans fat is found in many processed foods such as cookies and crackers. · Use olive or canola oil when you cook. Try cholesterol-lowering spreads, such as Benecol or Take Control. · Bake, broil, grill, or steam foods instead of frying them. · Choose lean meats instead of high-fat meats such as hot dogs and sausages. Cut off all visible fat when you prepare meat. · Eat fish, skinless poultry, and meat alternatives such as soy products instead of high-fat meats. Soy products, such as tofu, may be especially good for your heart. · Choose low-fat or fat-free milk and dairy products. Eat fish  · Eat at least two servings of fish a week. Certain fish, such as salmon and tuna, contain omega-3 fatty acids, which may help reduce your risk of heart attack. Eat foods high in fiber  · Eat a variety of grain products every day. Include whole-grain foods that have lots of fiber and nutrients. Examples of whole-grain foods include oats, whole wheat bread, and brown rice.   · Buy whole-grain breads and cereals, instead of white bread or pastries. Limit salt and sodium  · Limit how much salt and sodium you eat to help lower your blood pressure. · Taste food before you salt it. Add only a little salt when you think you need it. With time, your taste buds will adjust to less salt. · Eat fewer snack items, fast foods, and other high-salt, processed foods. Check food labels for the amount of sodium in packaged foods. · Choose low-sodium versions of canned goods (such as soups, vegetables, and beans). Limit sugar  · Limit drinks and foods with added sugar. These include candy, desserts, and soda pop. Limit alcohol  · Limit alcohol to no more than 2 drinks a day for men and 1 drink a day for women. Too much alcohol can cause health problems. When should you call for help? Watch closely for changes in your health, and be sure to contact your doctor if:  · You would like help planning heart-healthy meals. Where can you learn more? Go to http://shayla-michael.info/. Enter V137 in the search box to learn more about \"Heart-Healthy Diet: Care Instructions. \"  Current as of: January 27, 2016  Content Version: 11.1  © 7461-4991 [employer]. Care instructions adapted under license by [employer] (which disclaims liability or warranty for this information). If you have questions about a medical condition or this instruction, always ask your healthcare professional. [place] [address] any warranty or liability for your use of this information. Eating Healthy Foods: Care Instructions  Your Care Instructions  Eating healthy foods can help lower your risk for disease. Healthy food gives you energy and keeps your heart strong, your brain active, your muscles working, and your bones strong. A healthy diet includes a variety of foods from the basic food groups: grains, vegetables, fruits, milk and milk products, and meat and beans.  Some people may eat more of their favorite foods from only one food group and, as a result, miss getting the nutrients they need. So, it is important to pay attention not only to what you eat but also to what you are missing from your diet. You can eat a healthy, balanced diet by making a few small changes. Follow-up care is a key part of your treatment and safety. Be sure to make and go to all appointments, and call your doctor if you are having problems. Its also a good idea to know your test results and keep a list of the medicines you take. How can you care for yourself at home? Look at what you eat  · Keep a food diary for a week or two and record everything you eat or drink. Track the number of servings you eat from each food group. · For a balanced diet every day, eat a variety of:  ¨ 6 or more ounce-equivalents of grains, such as cereals, breads, crackers, rice, or pasta, every day. An ounce-equivalent is 1 slice of bread, 1 cup of ready-to-eat cereal, or ½ cup of cooked rice, cooked pasta, or cooked cereal.  ¨ 2½ cups of vegetables, especially:  § Dark-green vegetables such as broccoli and spinach. § Orange vegetables such as carrots and sweet potatoes. § Dry beans (such as suarez and kidney beans) and peas (such as lentils). ¨ 2 cups of fresh, frozen, or canned fruit. A small apple or 1 banana or orange equals 1 cup. ¨ 3 cups of nonfat or low-fat milk, yogurt, or other milk products. ¨ 5½ ounces of meat and beans, such as chicken, fish, lean meat, beans, nuts, and seeds. One egg, 1 tablespoon of peanut butter, ½ ounce nuts or seeds, or ¼ cup of cooked beans equals 1 ounce of meat. · Learn how to read food labels for serving sizes and ingredients. Fast-food and convenience-food meals often contain few or no fruits or vegetables. Make sure you eat some fruits and vegetables to make the meal more nutritious. · Look at your food diary.  For each food group, add up what you have eaten and then divide the total by the number of days. This will give you an idea of how much you are eating from each food group. See if you can find some ways to change your diet to make it more healthy. Start small  · Do not try to make dramatic changes to your diet all at once. You might feel that you are missing out on your favorite foods and then be more likely to fail. · Start slowly, and gradually change your habits. Try some of the following:  ¨ Use whole wheat bread instead of white bread. ¨ Use nonfat or low-fat milk instead of whole milk. ¨ Eat brown rice instead of white rice, and eat whole wheat pasta instead of white-flour pasta. ¨ Try low-fat cheeses and low-fat yogurt. ¨ Add more fruits and vegetables to meals and have them for snacks. ¨ Add lettuce, tomato, cucumber, and onion to sandwiches. ¨ Add fruit to yogurt and cereal.  Enjoy food  · You can still eat your favorite foods. You just may need to eat less of them. If your favorite foods are high in fat, salt, and sugar, limit how often you eat them, but do not cut them out entirely. · Eat a wide variety of foods. Make healthy choices when eating out  · The type of restaurant you choose can help you make healthy choices. Even fast-food chains are now offering more low-fat or healthier choices on the menu. · Choose smaller portions, or take half of your meal home. · When eating out, try:  ¨ A veggie pizza with a whole wheat crust or grilled chicken (instead of sausage or pepperoni). ¨ Pasta with roasted vegetables, grilled chicken, or marinara sauce instead of cream sauce. ¨ A vegetable wrap or grilled chicken wrap. ¨ Broiled or poached food instead of fried or breaded items. Make healthy choices easy  · Buy packaged, prewashed, ready-to-eat fresh vegetables and fruits, such as baby carrots, salad mixes, and chopped or shredded broccoli and cauliflower. · Buy packaged, presliced fruits, such as melon or pineapple.   · Choose 100% fruit or vegetable juice instead of soda. Limit juice intake to 4 to 6 oz (½ to ¾ cup) a day. · Blend low-fat yogurt, fruit juice, and canned or frozen fruit to make a smoothie for breakfast or a snack. Where can you learn more? Go to http://shayla-michael.info/. Enter T756 in the search box to learn more about \"Eating Healthy Foods: Care Instructions. \"  Current as of: November 20, 2015  Content Version: 11.1  © 8460-8259 [employer]. Care instructions adapted under license by [employer] (which disclaims liability or warranty for this information). If you have questions about a medical condition or this instruction, always ask your healthcare professional. [address] any warranty or liability for your use of this information. Nutrition Tips for Diabetes: After Your Visit  Your Care Instructions  A healthy diet is important to manage diabetes. It helps you lose weight (if you need to) and keep it off. It gives you the nutrition and energy your body needs and helps prevent heart disease. But a diet for diabetes does not mean that you have to eat special foods. You can eat what your family eats, including occasional sweets and other favorites. But you do have to pay attention to how often you eat and how much you eat of certain foods. The right plan for you will give you meals that help you keep your blood sugar at healthy levels. Try to eat a variety of foods and to spread carbohydrate throughout the day. Carbohydrate raises blood sugar higher and more quickly than any other nutrient does. Carbohydrate is found in sugar, breads and cereals, fruit, starchy vegetables such as potatoes and corn, and milk and yogurt. You may want to work with a dietitian or diabetes educator to help you plan meals and snacks. A dietitian or diabetes educator also can help you lose weight if that is one of your goals.  The following tips can help you enjoy your meals and stay healthy. Follow-up care is a key part of your treatment and safety. Be sure to make and go to all appointments, and call your doctor if you are having problems. Its also a good idea to know your test results and keep a list of the medicines you take. How can you care for yourself at home? · Learn which foods have carbohydrate and how much carbohydrate to eat. A dietitian or diabetes educator can help you learn to keep track of how much carbohydrate you eat. · Spread carbohydrate throughout the day. Eat some carbohydrate at all meals, but do not eat too much at any one time. · Plan meals to include food from all the food groups. These are the food groups and some example portion sizes:  ¨ Grains: 1 slice of bread (1 ounce), ½ cup of cooked cereal, and 1/3 cup of cooked pasta or rice. These have about 15 grams of carbohydrate in a serving. Choose whole grains such as whole wheat bread or crackers, oatmeal, and brown rice more often than refined grains. ¨ Fruit: 1 small fresh fruit, such as an apple or orange; ½ of a banana; ½ cup of chopped, cooked, or canned fruit; ½ cup of fruit juice; 1 cup of melon or raspberries; and 2 tablespoons of dried fruit. These have about 15 grams of carbohydrate in a serving. ¨ Dairy: 1 cup of nonfat or low-fat milk and 2/3 cup of plain yogurt. These have about 15 grams of carbohydrate in a serving. ¨ Protein foods: Beef, chicken, turkey, fish, eggs, tofu, cheese, cottage cheese, and peanut butter. A serving size of meat is 3 ounces, which is about the size of a deck of cards. Examples of meat substitute serving sizes (equal to 1 ounce of meat) are 1/4 cup of cottage cheese, 1 egg, 1 tablespoon of peanut butter, and ½ cup of tofu. These have very little or no carbohydrate per serving. ¨ Vegetables: Starchy vegetables such as ½ cup of cooked dried beans, peas, potatoes, or corn have about 15 grams of carbohydrate.  Nonstarchy vegetables have very little carbohydrate, such as 1 cup of raw leafy vegetables (such as spinach), ½ cup of other vegetables (cooked or chopped), and 3/4 cup of vegetable juice. · Use the plate format to plan meals. It is a good, quick way to make sure that you have a balanced meal. It also helps you spread carbohydrate throughout the day. You divide your plate by types of foods. Put vegetables on half the plate, meat or meat substitutes on one-quarter of the plate, and a grain or starchy vegetable (such as brown rice or a potato) in the final quarter of the plate. To this you can add a small piece of fruit and 1 cup of milk or yogurt, depending on how much carbohydrate you are supposed to eat at a meal.  · Talk to your dietitian or diabetes educator about ways to add limited amounts of sweets into your meal plan. You can eat these foods now and then, as long as you include the amount of carbohydrate they have in your daily carbohydrate allowance. · If you drink alcohol, limit it to no more than 1 drink a day for women and 2 drinks a day for men. If you are pregnant, no amount of alcohol is known to be safe. · Protein, fat, and fiber do not raise blood sugar as much as carbohydrate does. If you eat a lot of these nutrients in a meal, your blood sugar will rise more slowly than it would otherwise. · Limit saturated fats, such as those from meat and dairy products. Try to replace it with monounsaturated fat, such as olive oil. This is a healthier choice because people who have diabetes are at higher-than-average risk of heart disease. But use a modest amount of olive oil. A tablespoon of olive oil has 14 grams of fat and 120 calories. · Exercise lowers blood sugar. If you take insulin by shots or pump, you can use less than you would if you were not exercising. Keep in mind that timing matters.  If you exercise within 1 hour after a meal, your body may need less insulin for that meal than it would if you exercised 3 hours after the meal. Test your blood sugar to find out how exercise affects your need for insulin. · Exercise on most days of the week. Aim for at least 30 minutes. Exercise helps you stay at a healthy weight and helps your body use insulin. Walking is an easy way to get exercise. Gradually increase the amount you walk every day. You also may want to swim, bike, or do other activities. When you eat out  · Learn to estimate the serving sizes of foods that have carbohydrate. If you measure food at home, it will be easier to estimate the amount in a serving of restaurant food. · If the meal you order has too much carbohydrate (such as potatoes, corn, or baked beans), ask to have a low-carbohydrate food instead. Ask for a salad or green vegetables. · If you use insulin, check your blood sugar before and after eating out to help you plan how much to eat in the future. · If you eat more carbohydrate at a meal than you had planned, take a walk or do other exercise. This will help lower your blood sugar. Where can you learn more? Go to [employer]  Enter A927 in the search box to learn more about \"Nutrition Tips for Diabetes: After Your Visit. \"   © 4670-5664 [employer]. Care instructions adapted under license by [employer] (which disclaims liability or warranty for this information). This care instruction is for use with your licensed healthcare professional. If you have questions about a medical condition or this instruction, always ask your healthcare professional. [place] [address] any warranty or liability for your use of this information. Content Version: 15.6.720366; Current as of: June 4, 2014                 Well Visit, Ages 25 to 48: Care Instructions  Your Care Instructions  Physical exams can help you stay healthy. Your doctor has checked your overall health and may have suggested ways to take good care of yourself. He or she also may have recommended tests.  At home, you can help prevent illness with healthy eating, regular exercise, and other steps. Follow-up care is a key part of your treatment and safety. Be sure to make and go to all appointments, and call your doctor if you are having problems. It's also a good idea to know your test results and keep a list of the medicines you take. How can you care for yourself at home? · Reach and stay at a healthy weight. This will lower your risk for many problems, such as obesity, diabetes, heart disease, and high blood pressure. · Get at least 30 minutes of physical activity on most days of the week. Walking is a good choice. You also may want to do other activities, such as running, swimming, cycling, or playing tennis or team sports. Discuss any changes in your exercise program with your doctor. · Do not smoke or allow others to smoke around you. If you need help quitting, talk to your doctor about stop-smoking programs and medicines. These can increase your chances of quitting for good. · Talk to your doctor about whether you have any risk factors for sexually transmitted infections (STIs). Having one sex partner (who does not have STIs and does not have sex with anyone else) is a good way to avoid these infections. · Use birth control if you do not want to have children at this time. Talk with your doctor about the choices available and what might be best for you. · Protect your skin from too much sun. When you're outdoors from 10 a.m. to 4 p.m., stay in the shade or cover up with clothing and a hat with a wide brim. Wear sunglasses that block UV rays. Even when it's cloudy, put broad-spectrum sunscreen (SPF 30 or higher) on any exposed skin. · See a dentist one or two times a year for checkups and to have your teeth cleaned. · Wear a seat belt in the car. · Drink alcohol in moderation, if at all. That means no more than 2 drinks a day for men and 1 drink a day for women. Follow your doctor's advice about when to have certain tests.  These tests can spot problems early. For everyone  · Cholesterol. Have the fat (cholesterol) in your blood tested after age 21. Your doctor will tell you how often to have this done based on your age, family history, or other things that can increase your risk for heart disease. · Blood pressure. Have your blood pressure checked during a routine doctor visit. Your doctor will tell you how often to check your blood pressure based on your age, your blood pressure results, and other factors. · Vision. Talk with your doctor about how often to have a glaucoma test.  · Diabetes. Ask your doctor whether you should have tests for diabetes. · Colon cancer. Have a test for colon cancer at age 48. You may have one of several tests. If you are younger than 48, you may need a test earlier if you have any risk factors. Risk factors include whether you already had a precancerous polyp removed from your colon or whether your parent, brother, sister, or child has had colon cancer. For women  · Breast exam and mammogram. Talk to your doctor about when you should have a clinical breast exam and a mammogram. Medical experts differ on whether and how often women under 50 should have these tests. Your doctor can help you decide what is right for you. · Pap test and pelvic exam. Begin Pap tests at age 24. A Pap test is the best way to find cervical cancer. The test often is part of a pelvic exam. Ask how often to have this test.  · Tests for sexually transmitted infections (STIs). Ask whether you should have tests for STIs. You may be at risk if you have sex with more than one person, especially if your partners do not wear condoms. For men  · Tests for sexually transmitted infections (STIs). Ask whether you should have tests for STIs. You may be at risk if you have sex with more than one person, especially if you do not wear a condom.   · Testicular cancer exam. Ask your doctor whether you should check your testicles regularly. · Prostate exam. Talk to your doctor about whether you should have a blood test (called a PSA test) for prostate cancer. Experts differ on whether and when men should have this test. Some experts suggest it if you are older than 39 and are -American or have a father or brother who got prostate cancer when he was younger than 72. When should you call for help? Watch closely for changes in your health, and be sure to contact your doctor if you have any problems or symptoms that concern you. Where can you learn more? Go to http://shayla-michael.info/. Enter P072 in the search box to learn more about \"Well Visit, Ages 25 to 48: Care Instructions. \"  Current as of: July 19, 2016  Content Version: 11.1  © 7068-9154 [employer]. Care instructions adapted under license by [employer] (which disclaims liability or warranty for this information). If you have questions about a medical condition or this instruction, always ask your healthcare professional. [place] [address] any warranty or liability for your use of this information. Pelvic Prolapse: Before Your Surgery  What is surgery for pelvic prolapse? Your pelvic muscles hold your pelvic organs in place. If they become weak, your uterus, bowel, or bladder may press against your vagina. This is called a pelvic prolapse. Surgery puts your organ back in place. It also adds support to your muscles. You will be asleep during the surgery. You will not feel pain. The doctor can do the surgery in two ways. In open surgery, the doctor makes a cut in your belly or inside the vagina. The cut is called an incision. In laparoscopic surgery, the doctor puts a lighted tube and other surgical tools through small incisions near your belly button and groin. This tube is called a scope. It lets the doctor see your organs. If you have open surgery, you will go home in 1 to 4 days.  It usually takes about 4 to 6 weeks to fully recover. If you have laparoscopic surgery, you will go home in 1 or 2 days. It usually takes 1 to 2 weeks to fully recover. At home, you may need to wear a catheter. This is a tube in your bladder. It carries urine out of your body. After surgery, you may have less pain during sex. The surgery may also help with any bladder or bowel problems you may have had. If you still have your uterus, your ability to have children will not be affected. Follow-up care is a key part of your treatment and safety. Be sure to make and go to all appointments, and call your doctor if you are having problems. It's also a good idea to know your test results and keep a list of the medicines you take. What happens before surgery? Surgery can be stressful. This information will help you understand what you can expect. And it will help you safely prepare for surgery. Preparing for surgery  · Understand exactly what surgery is planned, along with the risks, benefits, and other options. · Tell your doctors ALL the medicines, vitamins, supplements, and herbal remedies you take. Some of these can increase the risk of bleeding or interact with anesthesia. · If you take blood thinners, such as warfarin (Coumadin), clopidogrel (Plavix), or aspirin, be sure to talk to your doctor. He or she will tell you if you should stop taking these medicines before your surgery. Make sure that you understand exactly what your doctor wants you to do. · Your doctor will tell you which medicines to take or stop before your surgery. You may need to stop taking certain medicines a week or more before surgery. So talk to your doctor as soon as you can. · If you have an advance directive, let your doctor know. It may include a living will and a durable power of  for health care. Bring a copy to the hospital. If you don't have one, you may want to prepare one. It lets your doctor and loved ones know your health care wishes. Doctors advise that everyone prepare these papers before any type of surgery or procedure. · You may need to take a laxative or enema before surgery. Your doctor will tell you how to do this. What happens on the day of surgery? · Follow the instructions exactly about when to stop eating and drinking. If you don't, your surgery may be canceled. If your doctor told you to take your medicines on the day of surgery, take them with only a sip of water. · Take a bath or shower before you come in for your surgery. Do not apply lotions, perfumes, deodorants, or nail polish. · Do not shave the surgical site yourself. · Take off all jewelry and piercings. And take out contact lenses, if you wear them. At the hospital or surgery center  · Bring a picture ID. · The area for surgery is often marked to make sure there are no errors. · You will be kept comfortable and safe by your anesthesia provider. You will be asleep during the surgery. · The surgery will take 45 minutes to 2 hours. Going home  · Be sure you have someone to drive you home. Anesthesia and pain medicine make it unsafe for you to drive. · You will be given more specific instructions about recovering from your surgery. They will cover things like diet, wound care, follow-up care, driving, and getting back to your normal routine. When should you call your doctor? · You have questions or concerns. · You don't understand how to prepare for your surgery. · You become ill before the surgery (such as fever, flu, or a cold). · You need to reschedule or have changed your mind about having the surgery. Where can you learn more? Go to http://shayla-michael.info/. Enter B560 in the search box to learn more about \"Pelvic Prolapse: Before Your Surgery. \"  Current as of: February 25, 2016  Content Version: 11.1  © 5133-8318 [employer].  Care instructions adapted under license by [employer] (which disclaims liability or warranty for this information). If you have questions about a medical condition or this instruction, always ask your healthcare professional. [address] any warranty or liability for your use of this information. Uterine Prolapse: Care Instructions  Your Care Instructions    When the uterus moves down in the pelvis and starts to press into the vagina, it is called uterine prolapse. This happens when the pelvic muscles and tissues get weak. Women often have this problem after they have children or when they get older. It can also happen if you are overweight or you have a long-term cough. These put extra pressure on the uterus. This problem may cause you to leak urine. Or you may have trouble passing urine or stool. You may feel pain during sex. But in most cases, prolapse doesn't cause more serious health problems. You may feel better if you change how you do some of your daily activities. And you can try exercises to make your pelvic muscles strong. But if these don't help, you may want to talk with your doctor about surgery. Follow-up care is a key part of your treatment and safety. Be sure to make and go to all appointments, and call your doctor if you are having problems. It's also a good idea to know your test results and keep a list of the medicines you take. How can you care for yourself at home? · Do not do activities that put pressure on your pelvic muscles. This includes heavy lifting and straining. · Do exercises to tighten and strengthen your pelvic muscles. These are called Kegel exercises. To do them:  ¨ Squeeze the muscles you use to stop urine. Your belly and thighs should not move. ¨ Hold the squeeze for 3 seconds. Then relax for 3 seconds. ¨ Start with 3 seconds. Then add 1 second each week until you are able to squeeze for 10 seconds. ¨ Repeat this 10 to 15 times. Do these exercises 3 or more times a day.   · Take an over-the-counter pain medicine, such as acetaminophen (Tylenol), ibuprofen (Advil, Motrin), or naproxen (Aleve), to relieve pain. Read and follow all instructions on the label. · Do not take two or more pain medicines at the same time unless the doctor told you to. Many pain medicines have acetaminophen, which is Tylenol. Too much acetaminophen (Tylenol) can be harmful. · Talk with your doctor about a vaginal pessary. This is a device that you put in your vagina to support the uterus. Your doctor can teach you how and when to remove it. You will also learn how to clean it and put it back in.  · If your doctor prescribes estrogen cream for your vagina, use it exactly as prescribed. · To relieve pressure on your vagina, lie down and put a pillow under your knees. Or you can lie on your side and bring your knees up to your chest.  · If you are overweight, talk to your doctor about safe ways to lose weight. When should you call for help? Call your doctor now or seek immediate medical care if:  · You have new urinary symptoms, such as pain when urinating or urinating often. · You have pain in your lower back or belly. Watch closely for changes in your health, and be sure to contact your doctor if:  · You feel something bulge outside of your vagina. · You have irregular vaginal bleeding. · You have a new vaginal discharge. · You are leaking urine. · Your symptoms keep you from doing your daily activities. · You have pain during sex. Where can you learn more? Go to http://shayla-michael.info/. Enter T393 in the search box to learn more about \"Uterine Prolapse: Care Instructions. \"  Current as of: February 25, 2016  Content Version: 11.1  © 0850-9792 [employer]. Care instructions adapted under license by Circle (which disclaims liability or warranty for this information).  If you have questions about a medical condition or this instruction, always ask your healthcare professional. [employer] disclaims any warranty or liability for your use of this information.

## 2017-03-24 NOTE — PROGRESS NOTES
Chief Complaint   Patient presents with    Well Woman     1. Have you been to the ER, urgent care clinic since your last visit? Hospitalized since your last visit? No    2. Have you seen or consulted any other health care providers outside of the [address] [place] [address] since your last visit? Include any pap smears or colon screening.  No

## 2017-03-28 LAB
CYTOLOGIST CVX/VAG CYTO: NORMAL
DX ICD CODE: NORMAL
HPV I/H RISK 1 DNA CVX QL PROBE+SIG AMP: NEGATIVE
Lab: NORMAL
OTHER STN SPEC: NORMAL
PATH REPORT.FINAL DX SPEC: NORMAL
STAT OF ADQ CVX/VAG CYTO-IMP: NORMAL

## 2017-03-31 RX ORDER — HYDROCHLOROTHIAZIDE 12.5 MG/1
CAPSULE ORAL
Qty: 30 CAP | Refills: 0 | Status: SHIPPED | OUTPATIENT
Start: 2017-03-31 | End: 2017-05-04 | Stop reason: SDUPTHER

## 2017-04-10 ENCOUNTER — TELEPHONE (OUTPATIENT)
Dept: FAMILY MEDICINE CLINIC | Age: 42
End: 2017-04-10

## 2017-04-10 NOTE — TELEPHONE ENCOUNTER
Patient called back. Looked in [facility] to see who patient had appt with. Provided information below. Patient is satisfied and thanked me for my help      Eliud Gabriel    Physician           Specialty     Obstetrics & Gynecology, Gynecology, Obstetrics       Primary Contact Information      Phone Fax E-mail Address     [phone] Not available.  [address]

## 2017-04-10 NOTE — TELEPHONE ENCOUNTER
[phone]  Patient called to say she was referred out. Now at the hospital and she left the paper work at home. Does not know who she has the appointment with. While on hold for referral assistance, there was a phone disconnect.

## 2017-04-14 ENCOUNTER — OFFICE VISIT (OUTPATIENT)
Dept: OBGYN CLINIC | Age: 42
End: 2017-04-14

## 2017-04-14 VITALS
HEART RATE: 65 BPM | HEIGHT: 65 IN | DIASTOLIC BLOOD PRESSURE: 80 MMHG | SYSTOLIC BLOOD PRESSURE: 121 MMHG | RESPIRATION RATE: 18 BRPM | WEIGHT: 207.2 LBS | BODY MASS INDEX: 34.52 KG/M2

## 2017-04-14 DIAGNOSIS — N81.2 INCOMPLETE UTEROVAGINAL PROLAPSE: Primary | ICD-10-CM

## 2017-04-14 NOTE — PROGRESS NOTES
This is a Luis Angel Good is a 46 y.o. female female,  patient, who presents for evaluation for a uterine prolapse. This problem had been going on for a six years. She complains of a bulge but not too symptomatic. She has occasional urge incontinence and wears a pad. Denies FLACO. Sounds like she used a pessary in the past and didn't like it. No bowel complaints. Still with regular menses. Not sexually active now and likely does not want any future pregnancy. Recent pap was normal.    Past Medical History:   Diagnosis Date    Allergic rhinitis     Diabetes mellitus ([address]     HTN (hypertension)     Diagnosed in ER in 2016 - on HCTZ - checking ambulatory BPs        No past surgical history on file.     Social History     Social History    Marital status: SINGLE     Spouse name: N/A    Number of children: N/A    Years of education: N/A     Social History Main Topics    Smoking status: Never Smoker    Smokeless tobacco: Not on file    Alcohol use No    Drug use: No    Sexual activity: Not Currently     Partners: Male     Other Topics Concern    Not on file     Social History Narrative       OB History    Para Term  AB SAB TAB Ectopic Multiple Living   4 2 2  2     2      # Outcome Date GA Lbr Alverto/2nd Weight Sex Delivery Anes PTL Lv   4 AB            3 AB            2 Term            1 Term                    PHYSICAL EXAMINATION    Constitutional  · Appearance: well-nourished, well developed, alert, in no acute distress    HENT  · Head and Face: appears normal    Neck  · Inspection/Palpation: normal appearance, no masses or tenderness  · Lymph Nodes: no lymphadenopathy present  · Thyroid: gland size normal, nontender, no nodules or masses present on palpation    Genitourinary  · External Genitalia: normal appearance for age with atrophy, no discharge present, no tenderness present, no inflammatory lesions present, no masses present  · Vagina: cervix protrudes through vaginal introitus 3 cm at restt  · Bladder: non-tender to palpation  · Urethra: appears normal  · Cervix: some inflammatory changes   · Uterus: NSSC, mobile  · Adnexa: no adnexal tenderness present, no adnexal masses present  · Perineum: perineum within normal limits, no evidence of trauma, no rashes or skin lesions present  · Anus: anus within normal limits, no hemorrhoids present  · Inguinal Lymph Nodes: no lymphadenopathy present    Skin  · General Inspection: no rash, no lesions identified    Neurologic/Psychiatric  · Mental Status:  · Orientation: grossly oriented to person, place and time  · Mood and Affect: mood normal, affect appropriate    A/ Uterine prolapse with procidentia     P/ Discussed treatment. At such a young age with such significant prolapse I think she should strongly consider surgical correction. Eventually she will have ulcerative changes on the cervix and bladder dysfunction. She will see Dr Johnathan Alves for further work up and possible treatment.

## 2017-04-14 NOTE — PATIENT INSTRUCTIONS
Uterine Prolapse: Care Instructions  Your Care Instructions    When the uterus moves down in the pelvis and starts to press into the vagina, it is called uterine prolapse. This happens when the pelvic muscles and tissues get weak. Women often have this problem after they have children or when they get older. It can also happen if you are overweight or you have a long-term cough. These put extra pressure on the uterus. This problem may cause you to leak urine. Or you may have trouble passing urine or stool. You may feel pain during sex. But in most cases, prolapse doesn't cause more serious health problems. You may feel better if you change how you do some of your daily activities. And you can try exercises to make your pelvic muscles strong. But if these don't help, you may want to talk with your doctor about surgery. Follow-up care is a key part of your treatment and safety. Be sure to make and go to all appointments, and call your doctor if you are having problems. It's also a good idea to know your test results and keep a list of the medicines you take. How can you care for yourself at home? · Do not do activities that put pressure on your pelvic muscles. This includes heavy lifting and straining. · Do exercises to tighten and strengthen your pelvic muscles. These are called Kegel exercises. To do them:  ¨ Squeeze the muscles you use to stop urine. Your belly and thighs should not move. ¨ Hold the squeeze for 3 seconds. Then relax for 3 seconds. ¨ Start with 3 seconds. Then add 1 second each week until you are able to squeeze for 10 seconds. ¨ Repeat this 10 to 15 times. Do these exercises 3 or more times a day. · Take an over-the-counter pain medicine, such as acetaminophen (Tylenol), ibuprofen (Advil, Motrin), or naproxen (Aleve), to relieve pain. Read and follow all instructions on the label. · Do not take two or more pain medicines at the same time unless the doctor told you to.  Many pain medicines have acetaminophen, which is Tylenol. Too much acetaminophen (Tylenol) can be harmful. · Talk with your doctor about a vaginal pessary. This is a device that you put in your vagina to support the uterus. Your doctor can teach you how and when to remove it. You will also learn how to clean it and put it back in.  · If your doctor prescribes estrogen cream for your vagina, use it exactly as prescribed. · To relieve pressure on your vagina, lie down and put a pillow under your knees. Or you can lie on your side and bring your knees up to your chest.  · If you are overweight, talk to your doctor about safe ways to lose weight. When should you call for help? Call your doctor now or seek immediate medical care if:  · You have new urinary symptoms, such as pain when urinating or urinating often. · You have pain in your lower back or belly. Watch closely for changes in your health, and be sure to contact your doctor if:  · You feel something bulge outside of your vagina. · You have irregular vaginal bleeding. · You have a new vaginal discharge. · You are leaking urine. · Your symptoms keep you from doing your daily activities. · You have pain during sex. Where can you learn more? Go to http://shayla-michael.info/. Enter I322 in the search box to learn more about \"Uterine Prolapse: Care Instructions. \"  Current as of: October 13, 2016  Content Version: 11.2  © 1386-9890 [employer]Digest. Care instructions adapted under license by Elast[employer] (which disclaims liability or warranty for this information). If you have questions about a medical condition or this instruction, always ask your healthcare professional. [address] any warranty or liability for your use of this information.

## 2017-04-26 ENCOUNTER — TELEPHONE (OUTPATIENT)
Dept: FAMILY MEDICINE CLINIC | Age: 42
End: 2017-04-26

## 2017-04-26 ENCOUNTER — OFFICE VISIT (OUTPATIENT)
Dept: OBGYN CLINIC | Age: 42
End: 2017-04-26

## 2017-04-26 VITALS
BODY MASS INDEX: 34.49 KG/M2 | DIASTOLIC BLOOD PRESSURE: 70 MMHG | SYSTOLIC BLOOD PRESSURE: 110 MMHG | WEIGHT: 207 LBS | HEIGHT: 65 IN

## 2017-04-26 DIAGNOSIS — N81.2 CERVICAL PROLAPSE: Primary | ICD-10-CM

## 2017-04-26 NOTE — PROGRESS NOTES
Prolapse evaluation  Chief Complaint   Uterine Prolapse    HPI:  The patient specifically complains of vaginal pressure and a bulging sensation at the introitus. She was seen by Dr. Lemuel Ramires on 4/14/17 and is here for a consultation. The bulge is visible to the patient. The patient states she has some difficulty emptying her bladder. She states that she has no problems completing bowel movements. She also complains of urinary incontinence, which is mild. She wears a pad everyday. She states the symptoms are accentuated by lifting, straining, and exercise. The symptoms are partially relieved by lying down. She has had no pelvic pain associated with this. The character of the pain is described as a pressure sensation located in the vagina. The patient has the following additional complaints: none  Past Medical History:   Diagnosis Date    Allergic rhinitis     Diabetes mellitus ([facility] [address]     HTN (hypertension)     Diagnosed in ER in 1/2016 - on HCTZ - checking ambulatory BPs     No past surgical history on file. Social History     Occupational History    Not on file. Social History Main Topics    Smoking status: Never Smoker    Smokeless tobacco: Not on file    Alcohol use No    Drug use: No    Sexual activity: Not Currently     Partners: Male     Family History   Problem Relation Age of Onset    Hypertension Mother     Diabetes Father     Hypertension Maternal Grandmother     Diabetes Brother        No Known Allergies  Prior to Admission medications    Medication Sig Start Date End Date Taking?  Authorizing Provider   hydroCHLOROthiazide (MICROZIDE) 12.5 mg capsule TAKE ONE CAPSULE BY MOUTH EVERY DAY 3/31/17   So Fang MD   metFORMIN (GLUCOPHAGE) 500 mg tablet TAKE 1 TABLET BY MOUTH TWICE DAILY WITH MEALS 2/28/17   So Fang MD   fluticasone [facility]) 50 mcg/actuation nasal spray 1 spray in each nostril daily PRn 12/13/16   So Fang MD   glucose blood VI test strips (BLOOD GLUCOSE TEST) strip Check blood sugar before breakfast and 2 hours after lunch (heaviest meal of day) 8/19/16   Nora Amato MD   Blood-Glucose Meter monitoring kit Check blood sugar before breakfast and 2 hours after lunch (heaviest meal of day)  Reli on prefared 5/14/15   Vicki Zaman MD   cetirizine (ZYRTEC) 5 mg tablet Take 1 Tab by mouth daily. 5/14/15   Vicki Zaman MD        Review of Systems: History obtained from the patient  Constitutional: negative for weight loss, fever, night sweats  HEENT: negative for hearing loss, earache, congestion, snoring, sorethroat  CV: negative for chest pain, palpitations, edema  Resp: negative for cough, shortness of breath, wheezing  Breast: negative for breast lumps, nipple discharge, galactorrhea  GI: negative for change in bowel habits, abdominal pain, black or bloody stools  : negative for frequency, dysuria, hematuria, vaginal discharge  MSK: negative for back pain, joint pain, muscle pain  Skin: negative for itching, rash, hives  Neuro: negative for dizziness, headache, confusion, weakness  Psych: negative for anxiety, depression, change in mood  Heme/lymph: negative for bleeding, bruising, pallor    Objective: There were no vitals taken for this visit.     Physical Exam:     Constitutional  · Appearance: well-nourished, well developed, alert, in no acute distress    HENT  · Head and Face: appears normal    Gastrointestinal  · Abdominal Examination: abdomen non-tender to palpation, normal bowel sounds, no masses present  · Liver and spleen: no hepatomegaly present, spleen not palpable  · Hernias: no hernias identified    Genitourinary  · External Genitalia: normal appearance for age, no discharge present, no tenderness present, no inflammatory lesions present, no masses present, no atrophy present  · Vagina: normal vaginal vault with no cystocele, no rectocele, minimal vaginal apex prolapse, no discharge present, no inflammatory lesions present, no masses present  · Bladder: non-tender to palpation  · Urethra: appears normal  · Cervix: normal but prolapsed out of the introitus and is 12 cm in length   · Uterus: minimal prolapse, otherwise normal size, shape and consistency except for some nodularity and tenderness of left uterosacral at vaginal apex  · Adnexa: no adnexal tenderness present, no adnexal masses present  · Perineum: perineum within normal limits, no evidence of trauma, no rashes or skin lesions present  · Anus: anus within normal limits, no hemorrhoids present  · Inguinal Lymph Nodes: no lymphadenopathy present    Skin  · General Inspection: no rash, no lesions identified    Neurologic/Psychiatric  · Mental Status:  · Orientation: grossly oriented to person, place and time  · Mood and Affect: mood normal, affect appropriate    Assessment:  Complete prolapse of the extremely elongated cervix. No USI and no cystocele, rectocele, or vaginal apex prolapse on exam.    Plan:   Pt advised that all she needs is a laparoscopic total hysterectomy. She is not 100% sure she does not want to get pregnant as \"is not \", although she has two older children and an adopted 1year old. RTO prn if symptoms persist or worsen. Instructions given to pt. Handouts given to pt.

## 2017-04-26 NOTE — Clinical Note
Cervical prolapse only--just needs LTH. Dheeraj [place] washy about future pregnancy, so not ready for surgery yet.

## 2017-04-26 NOTE — TELEPHONE ENCOUNTER
(Self) [phone]     Patient called to say she has questions about the paper work that was sent to her.

## 2017-04-28 NOTE — TELEPHONE ENCOUNTER
Spoke with pt who had questions about AVS that had information on a healthy heart . Advised pt that the information pertained to staying healthy and exercising and showing ways of how to lower blood pressure and how it can pertain to the heart. Pt verbalized understanding.

## 2017-05-04 NOTE — TELEPHONE ENCOUNTER
Patient needs a refill of the following  Requested Prescriptions     Pending Prescriptions Disp Refills    hydroCHLOROthiazide (MICROZIDE) 12.5 mg capsule 30 Cap 0

## 2017-05-06 NOTE — TELEPHONE ENCOUNTER
Patient wants to speak with Dr. Zora Vazquez to discuss why this prescription was not written with refills

## 2017-05-07 RX ORDER — HYDROCHLOROTHIAZIDE 12.5 MG/1
CAPSULE ORAL
Qty: 30 CAP | Refills: 3 | Status: SHIPPED | OUTPATIENT
Start: 2017-05-07 | End: 2017-08-14 | Stop reason: SDUPTHER

## 2017-05-09 NOTE — TELEPHONE ENCOUNTER
Spoke with pt regarding wanting to discuss refills for hydrochlorothiazide . Pt stated that she has received refill for hydrochlorothiazide but does not have refills. Advised pt that RX sent was sent with refills. Advised pt that I would call pharmacy to verify and will return call to pt once verified. Pt verbalized understanding.

## 2017-05-09 NOTE — TELEPHONE ENCOUNTER
Spoke with pharmacist Ana to verify that pt does have refills on patients prescription for hydrochlorothiazide. Pharmacist [facility] verified that pt does have 3 refills on prescription sent in by Dr. Rocky Henley. Called pt to notify her that I spoke with pharmacist to verify that her prescription for hydrochlorothiazide does have refills. Pt verbalized understanding.

## 2017-08-14 ENCOUNTER — OFFICE VISIT (OUTPATIENT)
Dept: FAMILY MEDICINE CLINIC | Age: 42
End: 2017-08-14

## 2017-08-14 VITALS
BODY MASS INDEX: 35.96 KG/M2 | TEMPERATURE: 99.2 F | HEIGHT: 65 IN | WEIGHT: 215.8 LBS | OXYGEN SATURATION: 99 % | RESPIRATION RATE: 17 BRPM | HEART RATE: 65 BPM | DIASTOLIC BLOOD PRESSURE: 85 MMHG | SYSTOLIC BLOOD PRESSURE: 118 MMHG

## 2017-08-14 DIAGNOSIS — E11.9 TYPE 2 DIABETES MELLITUS WITHOUT COMPLICATION, WITHOUT LONG-TERM CURRENT USE OF INSULIN (HCC): Primary | ICD-10-CM

## 2017-08-14 DIAGNOSIS — E66.01 MORBID OBESITY, UNSPECIFIED OBESITY TYPE (HCC): ICD-10-CM

## 2017-08-14 DIAGNOSIS — R14.3 EXCESSIVE GAS: ICD-10-CM

## 2017-08-14 DIAGNOSIS — I10 ESSENTIAL HYPERTENSION: ICD-10-CM

## 2017-08-14 RX ORDER — HYDROCHLOROTHIAZIDE 12.5 MG/1
CAPSULE ORAL
Qty: 90 CAP | Refills: 3 | Status: SHIPPED | OUTPATIENT
Start: 2017-08-14 | End: 2018-04-15 | Stop reason: SDUPTHER

## 2017-08-14 RX ORDER — METFORMIN HYDROCHLORIDE 500 MG/1
TABLET ORAL
Qty: 180 TAB | Refills: 0 | Status: SHIPPED | OUTPATIENT
Start: 2017-08-14 | End: 2017-12-08 | Stop reason: SDUPTHER

## 2017-08-14 NOTE — PROGRESS NOTES
Janice Cardenas is an 38 y.o. female who presents for   Chief Complaint   Patient presents with    Medication Refill     metformin     Patient with diabetes, HTN. Diabetes: currently on metformin 500 mg BID. Last A1c 6.8 on 8/30/16. Fasting BS and 2 hour PP no higher than 127. No side effects with medication. No visual changes. Has an eye appointment for September. HTN: currently on HCTZ 12.5 mg. No chest pain or shortness of breath. No side effects with medication. Does also report bloating and gas that is relieved when she moves around. Denies abd pain or dark/bloody stools. Has BM daily. Allergies - reviewed:   No Known Allergies      Medications - reviewed:   Current Outpatient Prescriptions   Medication Sig    hydroCHLOROthiazide (MICROZIDE) 12.5 mg capsule TAKE ONE CAPSULE BY MOUTH EVERY DAY    metFORMIN (GLUCOPHAGE) 500 mg tablet TAKE 1 TABLET BY MOUTH TWICE DAILY WITH MEALS    glucose blood VI test strips (BLOOD GLUCOSE TEST) strip Check blood sugar before breakfast and 2 hours after lunch (heaviest meal of day)    Blood-Glucose Meter monitoring kit Check blood sugar before breakfast and 2 hours after lunch (heaviest meal of day)  Reli on prefared    fluticasone (FLONASE) 50 mcg/actuation nasal spray 1 spray in each nostril daily PRn    cetirizine (ZYRTEC) 5 mg tablet Take 1 Tab by mouth daily. No current facility-administered medications for this visit. Past Medical History - reviewed:  Past Medical History:   Diagnosis Date    Allergic rhinitis     Diabetes mellitus ([facility]     HTN (hypertension)     Diagnosed in ER in 1/2016 - on HCTZ - checking ambulatory BPs         Past Surgical History - reviewed:   History reviewed. No pertinent surgical history.       Social History - reviewed:  Social History     Social History    Marital status: SINGLE     Spouse name: N/A    Number of children: N/A    Years of education: N/A     Occupational History    Not on file.     Social History Main Topics    Smoking status: Never Smoker    Smokeless tobacco: Never Used    Alcohol use No    Drug use: No    Sexual activity: Not Currently     Partners: Male     Other Topics Concern    Not on file     Social History Narrative         Family History - reviewed:  Family History   Problem Relation Age of Onset    Hypertension Mother     Diabetes Father     Hypertension Maternal Grandmother     Diabetes Brother          Immunizations - reviewed: There is no immunization history on file for this patient. ROS  EYES: Denies: decreased vision  CARDIOVASCULAR: Denies: chest pain  RESPIRATORY: Denies: shortness of breath  GI: bloating and gas, Denies: abdominal pain, black stool, blood in stool  NEURO: Denies: numbness/tingling      Physical Exam  Visit Vitals    /85 (BP 1 Location: Right arm, BP Patient Position: Sitting)    Pulse 65    Temp 99.2 °F (37.3 °C) (Oral)    Resp 17    Ht 5' 5\" (1.651 m)    Wt 215 lb 12.8 oz (97.9 kg)    LMP 08/13/2017    SpO2 99%    BMI 35.91 kg/m2       General appearance - alert, well appearing, and in no distress and overweight  Eyes - EOMI  Mouth - mucous membranes moist, pharynx normal without lesions  Chest - clear to auscultation, no wheezes, rales or rhonchi, symmetric air entry  Heart - normal rate, regular rhythm, normal S1, S2, no murmurs, rubs, clicks or gallops  Abdomen - soft, nontender, nondistended, no masses or organomegaly  Neurological - alert, oriented, normal speech, no focal findings or movement disorder noted  Musculoskeletal - no joint tenderness, deformity or swelling  Extremities - peripheral pulses normal, no pedal edema, no clubbing or cyanosis  Skin - normal coloration and turgor, no rashes, no suspicious skin lesions noted      Assessment/Plan    ICD-10-CM ICD-9-CM    1.  Type 2 diabetes mellitus without complication, without long-term current use of insulin ([facility]) E11.9 250.00 HEMOGLOBIN A1C WITH EAG MICROALBUMIN, UR, RAND      CBC W/O DIFF      METABOLIC PANEL, COMPREHENSIVE      LIPID PANEL       DIABETES EDUCATION      metFORMIN (GLUCOPHAGE) 500 mg tablet       DIABETES EYE EXAM   2. Essential hypertension I10 401.9 HEMOGLOBIN A1C WITH EAG      MICROALBUMIN, UR, RAND      CBC W/O DIFF      METABOLIC PANEL, COMPREHENSIVE      LIPID PANEL      hydroCHLOROthiazide (MICROZIDE) 12.5 mg capsule   3. Morbid obesity, unspecified obesity type [facility] E66.01 278.01 HEMOGLOBIN A1C WITH EAG      MICROALBUMIN, UR, RAND      CBC W/O DIFF      METABOLIC PANEL, COMPREHENSIVE      LIPID PANEL   4. Excessive gas R14.3 787.3        Routine labs today. Advised that patient bring eye exam results to next exam. Will continue current metformin for now and adjust PRN. Wlil need urine microalbumin check to determine if she needs ACE-I.    BP at goal today. Will continue current HCTZ. Advised that patient start eliminating 1 food item a day to determine cause of gassiness. Told patient to RTC if she sees blood in stool or has significant abd pain that is not relieved after passage of flatus. No constipation. Already avoids dairy foods. Follow-up Disposition:  Return in about 6 months (around 2/14/2018) for diabetes f/u. I have discussed the diagnosis with the patient and the intended plan as seen in the above orders. The patient has received an after-visit summary and questions were answered concerning future plans. I have discussed medication side effects and warnings with the patient as well. Hilary Granados MD  Family Medicine Resident    Patient discussed with Dr. Sonido Bliss, attending physician.

## 2017-08-14 NOTE — PATIENT INSTRUCTIONS
Albumin Urine Test: About This Test  What is it? An albumin urine test checks urine for a protein called albumin. This protein is normally found in the blood. When the kidneys are damaged, small amounts of albumin leak into the urine. This is called albuminuria. If the amount of albumin is very small, but still abnormal, it is called microalbuminuria. You might provide a urine sample for your doctor during a visit. Your doctor might also ask you for a one-time sample at home or over a specific period of time, such as over 4 hours or 24 hours. Your doctor will tell you what to do. Why is this test done? This test is done to check for albumin in the urine. It helps tell your doctor how well your kidneys are working. This test is done most often to check the kidneys in people with diabetes. Other conditions also cause albuminuria. These conditions include high blood pressure, heart failure, and cirrhosis. The sooner your doctor knows you have kidney damage, the more your doctor can do to protect your kidneys. How can you prepare for the test?  · Do not exercise just before the test.  · Tell your doctor if you are having your period or have vaginal discharge. · Tell your doctor about all the nonprescription and prescription medicines and herbs or other supplements you take. Some of these can affect the results of this test.  What happens before the test?  · Your doctor or the lab likely will give you the container you need to hold the urine. You will get instructions on when and how to collect the urine. This might be a one-time sample or a number of samples over a period of time. What happens during the test?  One-time urine collection  · Wash your hands before you start. · If the collection cup you are given has a lid, remove it carefully. Set it down with the inner surface up. Do not touch the inside of the cup with your fingers. · Clean the area around your genitals.   ¨ For men: Pull back the foreskin, if present, and clean the head of the penis with medicated towelettes or swabs. ¨ For women: Spread open the genital folds of skin with one hand. Then use medicated towelettes or swabs in your other hand to clean the area where urine comes out (the urethra). Wipe the area from front to back. · Start urinating into the toilet or urinal. A woman should hold apart the genital folds of skin while she urinates. · After the urine has flowed for several seconds, place the cup into the urine stream. Collect about 2 fluid ounces of this \"midstream\" urine without stopping your flow of urine. · Don't touch the rim of the cup to your genital area. Don't get toilet paper, pubic hair, stool (feces), menstrual blood, or anything else in the urine sample. · Finish urinating into the toilet or urinal.  · Carefully replace and tighten the lid on the cup, and then return it to the lab. If you are collecting the urine at home and can't get it to the lab in an hour, refrigerate it. Urine collection over time  You collect your urine for a period of time, such as over 4 or 24 hours. · You start collecting your urine in the morning. When you first get up, empty your bladder. But do not save this urine. Write down the time that you began. · For the set period of time, collect all your urine. Urinate into a small, clean container. Then pour the urine into the large container. Don't touch the inside of the container with your fingers. · Keep the collected urine in the refrigerator for the collection time. Empty your bladder for the last time at or just before the end of the collection period. Add this urine to the large container. Then write down the time. What else should you know about the test?  · If your results are higher than normal, your doctor may check your urine more often to watch for kidney damage. · If your test shows that you may have kidney damage, you may get other tests.   What happens after the test?  · Follow your doctor's instructions for taking the urine to the doctor's office or lab. · You can go back to your usual activities right away. When should you call for help? Watch closely for changes in your health, and be sure to contact your doctor if you have any problems. Follow-up care is a key part of your treatment and safety. Be sure to make and go to all appointments, and call your doctor if you are having problems. It's also a good idea to keep a list of the medicines you take. Ask your doctor when you can expect to have your test results. Where can you learn more? Go to http://shayla-michael.info/. Enter 321 9095 in the search box to learn more about \"Albumin Urine Test: About This Test.\"  Current as of: March 16, 2016  Content Version: 11.3  © 5044-1222 [employer]. Care instructions adapted under license by [employer] (which disclaims liability or warranty for this information). If you have questions about a medical condition or this instruction, always ask your healthcare professional. [address] any warranty or liability for your use of this information.

## 2017-08-14 NOTE — MR AVS SNAPSHOT
Visit Information Date & Time Provider Department Dept. Phone Encounter #  
 8/14/2017  1:30 PM Kasandra Banda MD [address] [place] [address] [phone] [id number] Follow-up Instructions Return in about 6 months (around 2/14/2018) for diabetes f/u. Upcoming Health Maintenance Date Due HEMOGLOBIN A1C Q6M 2/28/2017 MICROALBUMIN Q1 5/13/2017 LIPID PANEL Q1 8/30/2017 EYE EXAM RETINAL OR DILATED Q1 10/10/2018* FOOT EXAM Q1 3/24/2018 PAP AKA CERVICAL CYTOLOGY 3/24/2020 DTaP/Tdap/Td series (2 - Td) 5/13/2026 *Topic was postponed. The date shown is not the original due date. Allergies as of 8/14/2017  Review Complete On: 8/14/2017 By: Kasandra Banda MD  
 No Known Allergies Current Immunizations  Never Reviewed No immunizations on file. Not reviewed this visit You Were Diagnosed With   
  
 Codes Comments Type 2 diabetes mellitus without complication, without long-term current use of insulin (HCC)    -  Primary ICD-10-CM: E11.9 ICD-9-CM: 250.00 Essential hypertension     ICD-10-CM: I10 
ICD-9-CM: 401.9 Morbid obesity, [facility]     ICD-10-CM: E66.01 
ICD-9-CM: 278.01 Excessive gas     ICD-10-CM: R14.3 ICD-9-CM: 234. 3 Vitals BP Pulse Temp Resp Height(growth percentile) Weight(growth percentile) 118/85 (BP 1 Location: Right arm, BP Patient Position: Sitting) 65 99.2 °F (37.3 °C) (Oral) 17 5' 5\" (1.651 m) 215 lb 12.8 oz (97.9 kg) LMP SpO2 BMI OB Status Smoking Status 08/13/2017 99% 35.91 kg/m2 Having regular periods Never Smoker Vitals History BMI and BSA Data Body Mass Index Body Surface Area  
 35.91 kg/m 2 2.12 m 2 Preferred Pharmacy Pharmacy Name Phone [facility] [address], [address] [place] [address] [phone] Your Updated Medication List  
  
   
 This list is accurate as of: 8/14/17  1:59 PM.  Always use your most recent med list.  
  
  
  
  
 Blood-Glucose Meter monitoring kit Check blood sugar before breakfast and 2 hours after lunch (heaviest meal of day) Reli on prefared  
  
 cetirizine 5 mg tablet Commonly known as:  ZYRTEC Take 1 Tab by mouth daily. fluticasone 50 mcg/actuation nasal spray Commonly known as:  FLONASE  
1 spray in each nostril daily PRn  
  
 glucose blood VI test strips strip Commonly known as:  blood glucose test  
Check blood sugar before breakfast and 2 hours after lunch (heaviest meal of day)  
  
 hydroCHLOROthiazide 12.5 mg capsule Commonly known as:  Candida Joana TAKE ONE CAPSULE BY MOUTH EVERY DAY  
  
 metFORMIN 500 mg tablet Commonly known as:  GLUCOPHAGE  
TAKE 1 TABLET BY MOUTH TWICE DAILY WITH MEALS Prescriptions Sent to Pharmacy Refills  
 hydroCHLOROthiazide (MICROZIDE) 12.5 mg capsule 3 Sig: TAKE ONE CAPSULE BY MOUTH EVERY DAY Class: Normal  
 Pharmacy: [facility] [address] [place] [address] Ph #: [phone]  
 metFORMIN (GLUCOPHAGE) 500 mg tablet 0 Sig: TAKE 1 TABLET BY MOUTH TWICE DAILY WITH MEALS Class: Normal  
 Pharmacy: [facility] [address] [place] [address] Ph #: [phone] We Performed the Following CBC W/O DIFF [87109 CPT(R)] HEMOGLOBIN A1C WITH EAG [53515 CPT(R)]  DIABETES EDUCATION [5 Custom]  DIABETES EYE EXAM [6 Custom] LIPID PANEL [28935 CPT(R)] METABOLIC PANEL, COMPREHENSIVE [49099 CPT(R)] MICROALBUMIN, UR, RAND [id number] CPT(R)] Follow-up Instructions Return in about 6 months (around 2/14/2018) for diabetes f/u. Patient Instructions Albumin Urine Test: About This Test 
What is it? An albumin urine test checks urine for a protein called albumin.  This protein is normally found in the blood. When the kidneys are damaged, small amounts of albumin leak into the urine. This is called albuminuria. If the amount of albumin is very small, but still abnormal, it is called microalbuminuria. You might provide a urine sample for your doctor during a visit. Your doctor might also ask you for a one-time sample at home or over a specific period of time, such as over 4 hours or 24 hours. Your doctor will tell you what to do. Why is this test done? This test is done to check for albumin in the urine. It helps tell your doctor how well your kidneys are working. This test is done most often to check the kidneys in people with diabetes. Other conditions also cause albuminuria. These conditions include high blood pressure, heart failure, and cirrhosis. The sooner your doctor knows you have kidney damage, the more your doctor can do to protect your kidneys. How can you prepare for the test? 
· Do not exercise just before the test. 
· Tell your doctor if you are having your period or have vaginal discharge. · Tell your doctor about all the nonprescription and prescription medicines and herbs or other supplements you take. Some of these can affect the results of this test. 
What happens before the test? 
· Your doctor or the lab likely will give you the container you need to hold the urine. You will get instructions on when and how to collect the urine. This might be a one-time sample or a number of samples over a period of time. What happens during the test? 
One-time urine collection · Wash your hands before you start. · If the collection cup you are given has a lid, remove it carefully. Set it down with the inner surface up. Do not touch the inside of the cup with your fingers. · Clean the area around your genitals. ¨ For men: Pull back the foreskin, if present, and clean the head of the penis with medicated towelettes or swabs. ¨ For women: Spread open the genital folds of skin with one hand. Then use medicated towelettes or swabs in your other hand to clean the area where urine comes out (the urethra). Wipe the area from front to back. · Start urinating into the toilet or urinal. A woman should hold apart the genital folds of skin while she urinates. · After the urine has flowed for several seconds, place the cup into the urine stream. Collect about 2 fluid ounces of this \"midstream\" urine without stopping your flow of urine. · Don't touch the rim of the cup to your genital area. Don't get toilet paper, pubic hair, stool (feces), menstrual blood, or anything else in the urine sample. · Finish urinating into the toilet or urinal. 
· Carefully replace and tighten the lid on the cup, and then return it to the lab. If you are collecting the urine at home and can't get it to the lab in an hour, refrigerate it. Urine collection over time You collect your urine for a period of time, such as over 4 or 24 hours. · You start collecting your urine in the morning. When you first get up, empty your bladder. But do not save this urine. Write down the time that you began. · For the set period of time, collect all your urine. Urinate into a small, clean container. Then pour the urine into the large container. Don't touch the inside of the container with your fingers. · Keep the collected urine in the refrigerator for the collection time. Empty your bladder for the last time at or just before the end of the collection period. Add this urine to the large container. Then write down the time. What else should you know about the test? 
· If your results are higher than normal, your doctor may check your urine more often to watch for kidney damage. · If your test shows that you may have kidney damage, you may get other tests. What happens after the test? 
· Follow your doctor's instructions for taking the urine to the doctor's office or lab. · You can go back to your usual activities right away. When should you call for help? Watch closely for changes in your health, and be sure to contact your doctor if you have any problems. Follow-up care is a key part of your treatment and safety. Be sure to make and go to all appointments, and call your doctor if you are having problems. It's also a good idea to keep a list of the medicines you take. Ask your doctor when you can expect to have your test results. Where can you learn more? Go to http://shayla-michael.info/. Enter L729 in the search box to learn more about \"Albumin Urine Test: About This Test.\" Current as of: March 16, 2016 Content Version: 11.3 © 8938-9637 [employer]. Care instructions adapted under license by [employer] (which disclaims liability or warranty for this information). If you have questions about a medical condition or this instruction, always ask your healthcare professional. [place] [address] any warranty or liability for your use of this information. Introducing [facility]! [facility] introduces [employer] patient portal. Now you can access parts of your medical record, email your doctor's office, and request medication refills online. 1. In your internet browser, go to [email or web address] 2. Click on the First Time User? Click Here link in the Sign In box. You will see the New Member Sign Up page. 3. Enter your Ataxion Access Code exactly as it appears below. You will not need to use this code after youve completed the sign-up process. If you do not sign up before the expiration date, you must request a new code. · [employer] Access Code: [email or web address] Expires: 11/12/2017  1:41 PM 
 
4. Enter the last four digits of your Social Security Number (xxxx) and Date of Birth (mm/dd/yyyy) as indicated and click Submit. You will be taken to the next sign-up page. 5. Create a ZapHour ID. This will be your ZapHour login ID and cannot be changed, so think of one that is secure and easy to remember. 6. Create a ZapHour password. You can change your password at any time. 7. Enter your Password Reset Question and Answer. This can be used at a later time if you forget your password. 8. Enter your e-mail address. You will receive e-mail notification when new information is available in [address] 9. Click Sign Up. You can now view and download portions of your medical record. 10. Click the Download Summary menu link to download a portable copy of your medical information. If you have questions, please visit the Frequently Asked Questions section of the ZapHour website. Remember, ZapHour is NOT to be used for urgent needs. For medical emergencies, dial 911. Now available from your iPhone and Android! Please provide this summary of care documentation to your next provider. Your primary care clinician is listed as Sammy Simon. If you have any questions after today's visit, please call [phone].

## 2017-08-15 LAB
ALBUMIN SERPL-MCNC: 3.7 G/DL (ref 3.5–5.5)
ALBUMIN/GLOB SERPL: 1.1 {RATIO} (ref 1.2–2.2)
ALP SERPL-CCNC: 80 IU/L (ref 39–117)
ALT SERPL-CCNC: 7 IU/L (ref 0–32)
AST SERPL-CCNC: 13 IU/L (ref 0–40)
BILIRUB SERPL-MCNC: 0.3 MG/DL (ref 0–1.2)
BUN SERPL-MCNC: 8 MG/DL (ref 6–24)
BUN/CREAT SERPL: 15 (ref 9–23)
CALCIUM SERPL-MCNC: 9.4 MG/DL (ref 8.7–10.2)
CHLORIDE SERPL-SCNC: 100 MMOL/L (ref 96–106)
CHOLEST SERPL-MCNC: 160 MG/DL (ref 100–199)
CO2 SERPL-SCNC: 30 MMOL/L (ref 18–29)
CREAT SERPL-MCNC: 0.52 MG/DL (ref 0.57–1)
ERYTHROCYTE [DISTWIDTH] IN BLOOD BY AUTOMATED COUNT: 14.9 % (ref 12.3–15.4)
EST. AVERAGE GLUCOSE BLD GHB EST-MCNC: 137 MG/DL
GLOBULIN SER CALC-MCNC: 3.5 G/DL (ref 1.5–4.5)
GLUCOSE SERPL-MCNC: 144 MG/DL (ref 65–99)
HBA1C MFR BLD: 6.4 % (ref 4.8–5.6)
HCT VFR BLD AUTO: 39.9 % (ref 34–46.6)
HDLC SERPL-MCNC: 52 MG/DL
HGB BLD-MCNC: 12.8 G/DL (ref 11.1–15.9)
INTERPRETATION, 910389: NORMAL
LDLC SERPL CALC-MCNC: 91 MG/DL (ref 0–99)
Lab: NORMAL
MCH RBC QN AUTO: 28.4 PG (ref 26.6–33)
MCHC RBC AUTO-ENTMCNC: 32.1 G/DL (ref 31.5–35.7)
MCV RBC AUTO: 89 FL (ref 79–97)
MICROALBUMIN UR-MCNC: NORMAL UG/ML
PLATELET # BLD AUTO: 282 X10E3/UL (ref 150–379)
POTASSIUM SERPL-SCNC: 3.8 MMOL/L (ref 3.5–5.2)
PROT SERPL-MCNC: 7.2 G/DL (ref 6–8.5)
RBC # BLD AUTO: 4.5 X10E6/UL (ref 3.77–5.28)
SODIUM SERPL-SCNC: 141 MMOL/L (ref 134–144)
TRIGL SERPL-MCNC: 87 MG/DL (ref 0–149)
VLDLC SERPL CALC-MCNC: 17 MG/DL (ref 5–40)
WBC # BLD AUTO: 9.2 X10E3/UL (ref 3.4–10.8)

## 2017-12-08 DIAGNOSIS — E11.9 TYPE 2 DIABETES MELLITUS WITHOUT COMPLICATION, WITHOUT LONG-TERM CURRENT USE OF INSULIN (HCC): ICD-10-CM

## 2017-12-12 RX ORDER — METFORMIN HYDROCHLORIDE 500 MG/1
TABLET ORAL
Qty: 180 TAB | Refills: 0 | Status: SHIPPED | OUTPATIENT
Start: 2017-12-12 | End: 2018-03-12 | Stop reason: SDUPTHER

## 2018-01-03 ENCOUNTER — OFFICE VISIT (OUTPATIENT)
Dept: FAMILY MEDICINE CLINIC | Age: 43
End: 2018-01-03

## 2018-01-03 VITALS
TEMPERATURE: 98.5 F | HEIGHT: 65 IN | SYSTOLIC BLOOD PRESSURE: 124 MMHG | RESPIRATION RATE: 16 BRPM | WEIGHT: 215 LBS | BODY MASS INDEX: 35.82 KG/M2 | DIASTOLIC BLOOD PRESSURE: 87 MMHG | OXYGEN SATURATION: 100 % | HEART RATE: 77 BPM

## 2018-01-03 DIAGNOSIS — E11.9 TYPE 2 DIABETES MELLITUS WITHOUT COMPLICATION, WITHOUT LONG-TERM CURRENT USE OF INSULIN (HCC): ICD-10-CM

## 2018-01-03 DIAGNOSIS — J35.1 TONSILLAR HYPERTROPHY: ICD-10-CM

## 2018-01-03 DIAGNOSIS — R52 BODY ACHES: Primary | ICD-10-CM

## 2018-01-03 DIAGNOSIS — J06.9 VIRAL URI: ICD-10-CM

## 2018-01-03 LAB
QUICKVUE INFLUENZA TEST: NEGATIVE
S PYO AG THROAT QL: NEGATIVE
VALID INTERNAL CONTROL?: YES
VALID INTERNAL CONTROL?: YES

## 2018-01-03 RX ORDER — LANCETS
EACH MISCELLANEOUS
Qty: 100 EACH | Refills: 4 | Status: SHIPPED | OUTPATIENT
Start: 2018-01-03

## 2018-01-03 NOTE — PROGRESS NOTES
Olga Ambriz  38 y.o. female  1975  [address]  [id number]   [address]: Progress Note  Bethanie Barber MD       Encounter Date: 1/3/2018    Chief Complaint   Patient presents with    Generalized Body Aches    Headache     History of Present Illness   Olga Ambriz is a 38 y.o. female who presents to clinic today for c/o viral illness. She states she has been having relapsing remitting symptoms of ear fullness, concern for URI, nasal dryness and nasal congestion. denies any fever, no chills. No vomiting +nausea. Denies any diarrhea. +sick contact with her daughter 1 mo ago with URI. No dysphagia. Denies any recent abx. Duration of 3 days. Review of Systems   Review of Systems   Constitutional: Negative for chills and fever. HENT: Positive for congestion, ear pain and sinus pain. Negative for sore throat. Eyes: Negative for discharge. Respiratory: Negative for cough. Gastrointestinal: Positive for nausea. Negative for abdominal pain, diarrhea and vomiting. Musculoskeletal: Positive for myalgias. Vitals/Objective:     Vitals:    01/03/18 1518 01/03/18 1548   BP: (!) 148/97 124/87   Pulse: 77 77   Resp: 16    Temp: 98.5 °F (36.9 °C)    TempSrc: Oral    SpO2: 100%    Weight: 215 lb (97.5 kg)    Height: 5' 5\" (1.651 m)      Body mass index is 35.78 kg/(m^2). Physical Exam   Constitutional: She appears well-developed and well-nourished. HENT:   Head: Normocephalic and atraumatic. Right Ear: Tympanic membrane normal.   Left Ear: Tympanic membrane normal.   Nose: Mucosal edema present. Right sinus exhibits no maxillary sinus tenderness and no frontal sinus tenderness. Left sinus exhibits no maxillary sinus tenderness and no frontal sinus tenderness. Mouth/Throat: Uvula is midline, oropharynx is clear and moist and mucous membranes are normal.   2+ tonsils bilaterally   Eyes: Pupils are equal, round, and reactive to light.    Neck: Neck supple. No thyromegaly present. Cardiovascular: Normal rate, regular rhythm and normal heart sounds. Exam reveals no gallop and no friction rub. No murmur heard. Pulmonary/Chest: Effort normal and breath sounds normal. No respiratory distress. She has no wheezes. She has no rales. Abdominal: Soft. Bowel sounds are normal. She exhibits no distension. There is no tenderness. Lymphadenopathy:     She has no cervical adenopathy. Skin: Skin is warm and dry. No erythema. Recent Results (from the past 24 hour(s))   AMB POC RAPID INFLUENZA TEST    Collection Time: 01/03/18  3:27 PM   Result Value Ref Range    VALID INTERNAL CONTROL POC Yes     QuickVue Influenza test Negative Negative   AMB POC RAPID STREP A    Collection Time: 01/03/18  3:40 PM   Result Value Ref Range    VALID INTERNAL CONTROL POC Yes     Group A Strep Ag Negative Negative     Assessment and Plan:   1. Body aches  Negative flu and strep   Supportive care for symptomatic management. - AMB POC RAPID INFLUENZA TEST    2. Type 2 diabetes mellitus without complication, without long-term current use of insulin ([facility])  RF lancets. Pt to return in one month for DM health maintenance. - Lancets misc; Use as directed checking glucose BID  Dispense: 100 Each; Refill: 4    3. Tonsillar hypertrophy  - AMB POC RAPID STREP A    4. Viral URI      I have discussed the diagnosis with the patient and the intended plan as seen in the above orders. she has expressed understanding. The patient has received an after-visit summary and questions were answered concerning future plans. I have discussed medication side effects and warnings with the patient as well. Follow-up Disposition:  Return if symptoms worsen or fail to improve. Electronically Signed: Stephanie Machuca MD     History   Patients past medical, surgical and family histories were reviewed and updated.     Past Medical History:   Diagnosis Date    Allergic rhinitis     Diabetes mellitus ([address]     HTN (hypertension)     Diagnosed in ER in 1/2016 - on HCTZ - checking ambulatory BPs     History reviewed. No pertinent surgical history. Family History   Problem Relation Age of Onset    Hypertension Mother     Diabetes Father     Hypertension Maternal Grandmother     Diabetes Brother      Social History     Social History    Marital status: SINGLE     Spouse name: N/A    Number of children: N/A    Years of education: N/A     Occupational History    Not on file.      Social History Main Topics    Smoking status: Never Smoker    Smokeless tobacco: Never Used    Alcohol use No    Drug use: No    Sexual activity: Not Currently     Partners: Male     Other Topics Concern    Not on file     Social History Narrative            Current Medications/Allergies     Current Outpatient Prescriptions   Medication Sig Dispense Refill    Lancets misc Use as directed checking glucose  Each 4    metFORMIN (GLUCOPHAGE) 500 mg tablet TAKE 1 TABLET BY MOUTH TWICE DAILY WITH MEALS 180 Tab 0    hydroCHLOROthiazide (MICROZIDE) 12.5 mg capsule TAKE ONE CAPSULE BY MOUTH EVERY DAY 90 Cap 3    glucose blood VI test strips (BLOOD GLUCOSE TEST) strip Check blood sugar before breakfast and 2 hours after lunch (heaviest meal of day) 50 Strip 11    Blood-Glucose Meter monitoring kit Check blood sugar before breakfast and 2 hours after lunch (heaviest meal of day)  Reli on prefared 1 Kit 0     No Known Allergies

## 2018-01-03 NOTE — PATIENT INSTRUCTIONS
Viral Respiratory Infection: Care Instructions  Your Care Instructions    Viruses are very small organisms. They grow in number after they enter your body. There are many types that cause different illnesses, such as colds and the mumps. The symptoms of a viral respiratory infection often start quickly. They include a fever, sore throat, and runny nose. You may also just not feel well. Or you may not want to eat much. Most viral respiratory infections are not serious. They usually get better with time and self-care. Antibiotics are not used to treat a viral infection. That's because antibiotics will not help cure a viral illness. In some cases, antiviral medicine can help your body fight a serious viral infection. Follow-up care is a key part of your treatment and safety. Be sure to make and go to all appointments, and call your doctor if you are having problems. It's also a good idea to know your test results and keep a list of the medicines you take. How can you care for yourself at home? · Rest as much as possible until you feel better. · Be safe with medicines. Take your medicine exactly as prescribed. Call your doctor if you think you are having a problem with your medicine. You will get more details on the specific medicine your doctor prescribes. · Take an over-the-counter pain medicine, such as acetaminophen (Tylenol), ibuprofen (Advil, Motrin), or naproxen (Aleve), as needed for pain and fever. Read and follow all instructions on the label. Do not give aspirin to anyone younger than 20. It has been linked to Reye syndrome, a serious illness. · Drink plenty of fluids, enough so that your urine is light yellow or clear like water. Hot fluids, such as tea or soup, may help relieve congestion in your nose and throat. If you have kidney, heart, or liver disease and have to limit fluids, talk with your doctor before you increase the amount of fluids you drink.   · Try to clear mucus from your lungs by breathing deeply and coughing. · Gargle with warm salt water once an hour. This can help reduce swelling and throat pain. Use 1 teaspoon of salt mixed in 1 cup of warm water. · Do not smoke or allow others to smoke around you. If you need help quitting, talk to your doctor about stop-smoking programs and medicines. These can increase your chances of quitting for good. To avoid spreading the virus  · Cough or sneeze into a tissue. Then throw the tissue away. · If you don't have a tissue, use your hand to cover your cough or sneeze. Then clean your hand. You can also cough into your sleeve. · Wash your hands often. Use soap and warm water. Wash for 15 to 20 seconds each time. · If you don't have soap and water near you, you can clean your hands with alcohol wipes or gel. When should you call for help? Call your doctor now or seek immediate medical care if:  ? · You have a new or higher fever. ? · Your fever lasts more than 48 hours. ? · You have trouble breathing. ? · You have a fever with a stiff neck or a severe headache. ? · You are sensitive to light. ? · You feel very sleepy or confused. ? Watch closely for changes in your health, and be sure to contact your doctor if:  ? · You do not get better as expected. Where can you learn more? Go to http://shayla-michael.info/. Enter G146 in the search box to learn more about \"Viral Respiratory Infection: Care Instructions. \"  Current as of: May 12, 2017  Content Version: 11.4  © 3630-0445 [employer]. Care instructions adapted under license by [employer] (which disclaims liability or warranty for this information). If you have questions about a medical condition or this instruction, always ask your healthcare professional. [address] any warranty or liability for your use of this information.

## 2018-01-03 NOTE — MR AVS SNAPSHOT
Visit Information Date & Time Provider Department Dept. Phone Encounter #  
 1/3/2018  2:45 PM Delaney Ralph, Sara Arriola [place] [phone] [id number] Follow-up Instructions Return if symptoms worsen or fail to improve. Upcoming Health Maintenance Date Due  
 EYE EXAM RETINAL OR DILATED Q1 10/10/2018* HEMOGLOBIN A1C Q6M 2/14/2018 FOOT EXAM Q1 3/24/2018 MICROALBUMIN Q1 8/14/2018 LIPID PANEL Q1 8/14/2018 PAP AKA CERVICAL CYTOLOGY 3/24/2020 DTaP/Tdap/Td series (2 - Td) 5/13/2026 *Topic was postponed. The date shown is not the original due date. Allergies as of 1/3/2018  Review Complete On: 1/3/2018 By: Delaney Ralph MD  
 No Known Allergies Current Immunizations  Never Reviewed No immunizations on file. Not reviewed this visit You Were Diagnosed With   
  
 Codes Comments Body aches    -  Primary ICD-10-CM: V14 ICD-9-CM: 780.96 Type 2 diabetes mellitus without complication, without long-term current use of insulin (HCC)     ICD-10-CM: E11.9 ICD-9-CM: 250.00 Tonsillar hypertrophy     ICD-10-CM: J35.1 ICD-9-CM: 474.11 Viral URI     ICD-10-CM: J06.9, B97.89 ICD-9-CM: 465.9 Vitals BP Pulse Temp Resp Height(growth percentile) Weight(growth percentile) 124/87 (BP 1 Location: Left arm, BP Patient Position: Sitting) 77 98.5 °F (36.9 °C) (Oral) 16 5' 5\" (1.651 m) 215 lb (97.5 kg) SpO2 BMI OB Status Smoking Status 100% 35.78 kg/m2 Having regular periods Never Smoker Vitals History BMI and BSA Data Body Mass Index Body Surface Area 35.78 kg/m 2 2.11 m 2 Preferred Pharmacy Pharmacy Name Phone [facility] [address], [facility] [phone] Your Updated Medication List  
  
   
This list is accurate as of: 1/3/18  3:49 PM.  Always use your most recent med list.  
  
  
  
  
 Blood-Glucose Meter monitoring kit Check blood sugar before breakfast and 2 hours after lunch (heaviest meal of day) Reli on prefared  
  
 glucose blood VI test strips strip Commonly known as:  blood glucose test  
Check blood sugar before breakfast and 2 hours after lunch (heaviest meal of day)  
  
 hydroCHLOROthiazide 12.5 mg capsule Commonly known as:  Allen Deborah TAKE ONE CAPSULE BY MOUTH EVERY DAY Lancets Mis Use as directed checking glucose BID  
  
 metFORMIN 500 mg tablet Commonly known as:  GLUCOPHAGE  
TAKE 1 TABLET BY MOUTH TWICE DAILY WITH MEALS Prescriptions Sent to Pharmacy Refills Lancets misc 4 Sig: Use as directed checking glucose BID Class: Normal  
 Pharmacy: [facility]  [address] #: [phone] We Performed the Following AMB POC RAPID INFLUENZA TEST [01722 CPT(R)] AMB POC RAPID STREP A [38024 CPT(R)] Follow-up Instructions Return if symptoms worsen or fail to improve. Patient Instructions Viral Respiratory Infection: Care Instructions Your Care Instructions Viruses are very small organisms. They grow in number after they enter your body. There are many types that cause different illnesses, such as colds and the mumps. The symptoms of a viral respiratory infection often start quickly. They include a fever, sore throat, and runny nose. You may also just not feel well. Or you may not want to eat much. Most viral respiratory infections are not serious. They usually get better with time and self-care. Antibiotics are not used to treat a viral infection. That's because antibiotics will not help cure a viral illness. In some cases, antiviral medicine can help your body fight a serious viral infection. Follow-up care is a key part of your treatment and safety.  Be sure to make and go to all appointments, and call your doctor if you are having problems. It's also a good idea to know your test results and keep a list of the medicines you take. How can you care for yourself at home? · Rest as much as possible until you feel better. · Be safe with medicines. Take your medicine exactly as prescribed. Call your doctor if you think you are having a problem with your medicine. You will get more details on the specific medicine your doctor prescribes. · Take an over-the-counter pain medicine, such as acetaminophen (Tylenol), ibuprofen (Advil, Motrin), or naproxen (Aleve), as needed for pain and fever. Read and follow all instructions on the label. Do not give aspirin to anyone younger than 20. It has been linked to Reye syndrome, a serious illness. · Drink plenty of fluids, enough so that your urine is light yellow or clear like water. Hot fluids, such as tea or soup, may help relieve congestion in your nose and throat. If you have kidney, heart, or liver disease and have to limit fluids, talk with your doctor before you increase the amount of fluids you drink. · Try to clear mucus from your lungs by breathing deeply and coughing. · Gargle with warm salt water once an hour. This can help reduce swelling and throat pain. Use 1 teaspoon of salt mixed in 1 cup of warm water. · Do not smoke or allow others to smoke around you. If you need help quitting, talk to your doctor about stop-smoking programs and medicines. These can increase your chances of quitting for good. To avoid spreading the virus · Cough or sneeze into a tissue. Then throw the tissue away. · If you don't have a tissue, use your hand to cover your cough or sneeze. Then clean your hand. You can also cough into your sleeve. · Wash your hands often. Use soap and warm water. Wash for 15 to 20 seconds each time. · If you don't have soap and water near you, you can clean your hands with alcohol wipes or gel. When should you call for help? Call your doctor now or seek immediate medical care if: 
? · You have a new or higher fever. ? · Your fever lasts more than 48 hours. ? · You have trouble breathing. ? · You have a fever with a stiff neck or a severe headache. ? · You are sensitive to light. ? · You feel very sleepy or confused. ? Watch closely for changes in your health, and be sure to contact your doctor if: 
? · You do not get better as expected. Where can you learn more? Go to http://shayla-michael.info/. Enter I758 in the search box to learn more about \"Viral Respiratory Infection: Care Instructions. \" Current as of: May 12, 2017 Content Version: 11.4 © 9812-1561 [employer]. Care instructions adapted under license by [employer] (which disclaims liability or warranty for this information). If you have questions about a medical condition or this instruction, always ask your healthcare professional. [place] [address] any warranty or liability for your use of this information. Introducing [facility]! [facility] introduces ShareYourCart patient portal. Now you can access parts of your medical record, email your doctor's office, and request medication refills online. 1. In your internet browser, go to [email or web address] 2. Click on the First Time User? Click Here link in the Sign In box. You will see the New Member Sign Up page. 3. Enter your ShareYourCart Access Code exactly as it appears below. You will not need to use this code after youve completed the sign-up process. If you do not sign up before the expiration date, you must request a new code. · ShareYourCart Access Code: [email or web address] Expires: 4/3/2018  3:49 PM 
 
4. Enter the last four digits of your Social Security Number (xxxx) and Date of Birth (mm/dd/yyyy) as indicated and click Submit. You will be taken to the next sign-up page. 5. Create a Lynk ID. This will be your Lynk login ID and cannot be changed, so think of one that is secure and easy to remember. 6. Create a Lynk password. You can change your password at any time. 7. Enter your Password Reset Question and Answer. This can be used at a later time if you forget your password. 8. Enter your e-mail address. You will receive e-mail notification when new information is available in [address] 9. Click Sign Up. You can now view and download portions of your medical record. 10. Click the Download Summary menu link to download a portable copy of your medical information. If you have questions, please visit the Frequently Asked Questions section of the Lynk website. Remember, Lynk is NOT to be used for urgent needs. For medical emergencies, dial 911. Now available from your iPhone and Android! Please provide this summary of care documentation to your next provider. Your primary care clinician is listed as Jeremy Capps. If you have any questions after today's visit, please call [phone].

## 2018-03-12 DIAGNOSIS — E11.9 TYPE 2 DIABETES MELLITUS WITHOUT COMPLICATION, WITHOUT LONG-TERM CURRENT USE OF INSULIN (HCC): ICD-10-CM

## 2018-03-19 RX ORDER — METFORMIN HYDROCHLORIDE 500 MG/1
TABLET ORAL
Qty: 180 TAB | Refills: 0 | Status: SHIPPED | OUTPATIENT
Start: 2018-03-19 | End: 2018-07-22 | Stop reason: SDUPTHER

## 2018-03-26 ENCOUNTER — OFFICE VISIT (OUTPATIENT)
Dept: FAMILY MEDICINE CLINIC | Age: 43
End: 2018-03-26

## 2018-03-26 VITALS
HEART RATE: 65 BPM | RESPIRATION RATE: 16 BRPM | DIASTOLIC BLOOD PRESSURE: 72 MMHG | WEIGHT: 215 LBS | HEIGHT: 65 IN | BODY MASS INDEX: 35.82 KG/M2 | OXYGEN SATURATION: 99 % | SYSTOLIC BLOOD PRESSURE: 112 MMHG | TEMPERATURE: 98.3 F

## 2018-03-26 DIAGNOSIS — E11.9 TYPE 2 DIABETES MELLITUS WITHOUT COMPLICATION, WITHOUT LONG-TERM CURRENT USE OF INSULIN (HCC): Primary | ICD-10-CM

## 2018-03-26 DIAGNOSIS — I10 ESSENTIAL HYPERTENSION: ICD-10-CM

## 2018-03-26 DIAGNOSIS — E66.9 CLASS 2 OBESITY WITH BODY MASS INDEX (BMI) OF 35.0 TO 35.9 IN ADULT, UNSPECIFIED OBESITY TYPE, UNSPECIFIED WHETHER SERIOUS COMORBIDITY PRESENT: ICD-10-CM

## 2018-03-26 NOTE — PROGRESS NOTES
Chief Complaint   Patient presents with    Diabetes     follow up     1. Have you been to the ER, urgent care clinic since your last visit? Hospitalized since your last visit? No    2. Have you seen or consulted any other health care providers outside of the [address] [place] [address] since your last visit? Include any pap smears or colon screening.  No

## 2018-03-26 NOTE — PROGRESS NOTES
Florie Phoenix is an 38 y.o. female who presents for   Chief Complaint   Patient presents with    Diabetes     follow up     Taking metformin 500 mg BID for diabetes. Fasting 's, evening peak 140s. Denies abd pain, n/v, early satiety, numbness or tingling in extremities, visual changes. Tolerating medication well. On HCTZ 12.5 mg daily for HTN. Denies chest pain, shortness of breath. Notes frequent urination at night after being started on HCTZ but otherwise tolerating medication well. Diet: Watches sugar intake. Does not drink soda. Decreased carb intake. Exercise: Admits decreased walking a bit due to weather. During summertime walks 30 minutes every evening. 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 Allergies - reviewed:   No Known Allergies      Medications - reviewed:   Current Outpatient Prescriptions   Medication Sig    metFORMIN (GLUCOPHAGE) 500 mg tablet TAKE 1 TABLET BY MOUTH TWICE DAILY WITH MEALS    Lancets misc Use as directed checking glucose BID    hydroCHLOROthiazide (MICROZIDE) 12.5 mg capsule TAKE ONE CAPSULE BY MOUTH EVERY DAY    glucose blood VI test strips (BLOOD GLUCOSE TEST) strip Check blood sugar before breakfast and 2 hours after lunch (heaviest meal of day)    Blood-Glucose Meter monitoring kit Check blood sugar before breakfast and 2 hours after lunch (heaviest meal of day)  Reli on prefared     No current facility-administered medications for this visit. Past Medical History - reviewed:  Past Medical History:   Diagnosis Date    Allergic rhinitis     Diabetes mellitus ([facility]     HTN (hypertension)     Diagnosed in ER in 1/2016 - on HCTZ - checking ambulatory BPs         Social History - reviewed:  Social History     Social History    Marital status: SINGLE     Spouse name: N/A    Number of children: N/A    Years of education: N/A     Occupational History    Not on file.      Social History Main Topics    Smoking status: Never Smoker    Smokeless tobacco: Never Used    Alcohol use No    Drug use: No    Sexual activity: Not Currently     Partners: Male     Other Topics Concern    Not on file     Social History Narrative         ROS  EYES: Denies: decreased vision  CARDIOVASCULAR: Denies: chest pain  RESPIRATORY: Denies: cough, shortness of breath  GI: Denies: abdominal pain, nausea, vomiting  NEURO: Denies: numbness/tingling      Physical Exam  Visit Vitals    /72    Pulse 65    Temp 98.3 °F (36.8 °C) (Oral)    Resp 16    Ht 5' 5\" (1.651 m)    Wt 215 lb (97.5 kg)    LMP 03/23/2018    SpO2 99%    BMI 35.78 kg/m2       General appearance - alert, well appearing, and in no distress and overweight  Nose - normal and patent, no erythema, discharge or polyps  Mouth - mucous membranes moist, pharynx normal without lesions  Chest - clear to auscultation, no wheezes, rales or rhonchi, symmetric air entry  Heart - normal rate, regular rhythm, normal S1, S2, no murmurs, rubs, clicks or gallops  Abdomen - soft, nontender, nondistended, no masses or organomegaly  Neurological - alert, oriented, normal speech, no focal findings or movement disorder noted  Musculoskeletal - no joint tenderness, deformity or swelling  Extremities - peripheral pulses normal, no pedal edema, no clubbing or cyanosis  Foot exam: monofilament sensation intact b/l feet  Skin - normal coloration and turgor, no rashes, no suspicious skin lesions noted      Assessment/Plan    ICD-10-CM ICD-9-CM    1. Type 2 diabetes mellitus without complication, without long-term current use of insulin ([facility]) E11.9 250.00  DIABETES FOOT EXAM      METABOLIC PANEL, BASIC      HEMOGLOBIN A1C WITH EAG   2. Essential hypertension I10 401.9    3. Class 2 obesity with body mass index (BMI) of 35.0 to 35.9 in adult, unspecified obesity type, unspecified whether serious comorbidity present E66.9 278.00     Z68.35 V85.35      1.  Diabetes  Last A1c 6.4 in 8/2017. Tolerating metformin. Will draw A1c today and continue current regimen. Current ASCVD risk 1.6% -- discussed statin with patient (diabetes with LDL > 70) but declines treatment. 2. HTN  BP well-controlled at today's visit. Tolerating HCTZ. Will draw BMP today and continue current regimen. 3. Obesity  Stable weight since last visit 8/2017. Discussed importance of diet and exercise for diabetes and weight management, particularly since patient is hesitant to start medications. Recommended setting a goal of losing at least 1 lb a month and to f/u in 6 months to discuss weight and diabetes. Follow-up Disposition:  Return in about 6 months (around 9/26/2018) for diabetes. I have discussed the diagnosis with the patient and the intended plan as seen in the above orders. The patient has received an after-visit summary and questions were answered concerning future plans. I have discussed medication side effects and warnings with the patient as well. Alexander Choudhury MD  Family Medicine Resident    Patient discussed with Dr. Margareth Bray, attending physician.

## 2018-03-26 NOTE — MR AVS SNAPSHOT
[address] [phone] Patient: Pau Perry MRN: [id number] Visit Information Date & Time Provider Department Dept. Phone Encounter #  
 3/26/2018  3:35 PM Erin Mesa, Sara Kirkland [phone] [id number] Follow-up Instructions Return in about 6 months (around 9/26/2018) for diabetes. Follow-up and Disposition History Upcoming Health Maintenance Date Due HEMOGLOBIN A1C Q6M 2/14/2018 FOOT EXAM Q1 3/24/2018 EYE EXAM RETINAL OR DILATED Q1 10/10/2018* MICROALBUMIN Q1 8/14/2018 LIPID PANEL Q1 8/14/2018 PAP AKA CERVICAL CYTOLOGY 3/24/2020 DTaP/Tdap/Td series (2 - Td) 5/13/2026 *Topic was postponed. The date shown is not the original due date. Allergies as of 3/26/2018  Review Complete On: 3/26/2018 By: Erin Mesa MD  
 No Known Allergies Current Immunizations  Never Reviewed No immunizations on file. Not reviewed this visit You Were Diagnosed With   
  
 Codes Comments Type 2 diabetes mellitus without complication, without long-term current use of insulin (HCC)    -  Primary ICD-10-CM: E11.9 ICD-9-CM: 250.00 Essential hypertension     ICD-10-CM: I10 
ICD-9-CM: 401.9 Class 2 obesity with body mass index (BMI) of 35.0 to 35.9 in adult, unspecified obesity type, unspecified whether serious comorbidity present     ICD-10-CM: E66.9, Z68.35 ICD-9-CM: 278.00, V85.35 Vitals BP Pulse Temp Resp Height(growth percentile) Weight(growth percentile) 112/72 65 98.3 °F (36.8 °C) (Oral) 16 5' 5\" (1.651 m) 215 lb (97.5 kg) LMP SpO2 BMI OB Status Smoking Status 03/23/2018 99% 35.78 kg/m2 Having regular periods Never Smoker Vitals History BMI and BSA Data Body Mass Index Body Surface Area 35.78 kg/m 2 2.11 m 2 Preferred Pharmacy Pharmacy Name Phone [facility] [address], [facility] [phone] Your Updated Medication List  
  
   
This list is accurate as of 3/26/18  4:28 PM.  Always use your most recent med list.  
  
  
  
  
 Blood-Glucose Meter monitoring kit Check blood sugar before breakfast and 2 hours after lunch (heaviest meal of day) Reli on prefared  
  
 glucose blood VI test strips strip Commonly known as:  blood glucose test  
Check blood sugar before breakfast and 2 hours after lunch (heaviest meal of day)  
  
 hydroCHLOROthiazide 12.5 mg capsule Commonly known as:  Kimmie Winsome TAKE ONE CAPSULE BY MOUTH EVERY DAY Lancets Misc Use as directed checking glucose BID  
  
 metFORMIN 500 mg tablet Commonly known as:  GLUCOPHAGE  
TAKE 1 TABLET BY MOUTH TWICE DAILY WITH MEALS We Performed the Following HEMOGLOBIN A1C WITH EAG [20075 CPT(R)]  DIABETES FOOT EXAM [HM7 Custom] METABOLIC PANEL, BASIC [96613 CPT(R)] Follow-up Instructions Return in about 6 months (around 9/26/2018) for diabetes. Introducing [facility]! Carmelita Fothergill introduces [employer] patient portal. Now you can access parts of your medical record, email your doctor's office, and request medication refills online. 1. In your internet browser, go to [email or web address] 2. Click on the First Time User? Click Here link in the Sign In box. You will see the New Member Sign Up page. 3. Enter your [employer] Access Code exactly as it appears below. You will not need to use this code after youve completed the sign-up process. If you do not sign up before the expiration date, you must request a new code. · [employer] Access Code: [email or web address] Expires: 4/3/2018  4:49 PM 
 
4. Enter the last four digits of your Social Security Number (xxxx) and Date of Birth (mm/dd/yyyy) as indicated and click Submit. You will be taken to the next sign-up page. 5. Create a DJZ ID. This will be your DJ[employer] login ID and cannot be changed, so think of one that is secure and easy to remember. 6. Create a DJZ password. You can change your password at any time. 7. Enter your Password Reset Question and Answer. This can be used at a later time if you forget your password. 8. Enter your e-mail address. You will receive e-mail notification when new information is available in [address] 9. Click Sign Up. You can now view and download portions of your medical record. 10. Click the Download Summary menu link to download a portable copy of your medical information. If you have questions, please visit the Frequently Asked Questions section of the DJ[employer] website. Remember, [employer] is NOT to be used for urgent needs. For medical emergencies, dial 911. Now available from your iPhone and Android! Please provide this summary of care documentation to your next provider. Your primary care clinician is listed as Sherron Cooper. If you have any questions after today's visit, please call [phone].

## 2018-03-27 LAB
BUN SERPL-MCNC: 13 MG/DL (ref 6–24)
BUN/CREAT SERPL: 23 (ref 9–23)
CALCIUM SERPL-MCNC: 9.4 MG/DL (ref 8.7–10.2)
CHLORIDE SERPL-SCNC: 96 MMOL/L (ref 96–106)
CO2 SERPL-SCNC: 29 MMOL/L (ref 18–29)
CREAT SERPL-MCNC: 0.57 MG/DL (ref 0.57–1)
EST. AVERAGE GLUCOSE BLD GHB EST-MCNC: 146 MG/DL
GFR SERPLBLD CREATININE-BSD FMLA CKD-EPI: 116 ML/MIN/1.73
GFR SERPLBLD CREATININE-BSD FMLA CKD-EPI: 134 ML/MIN/1.73
GLUCOSE SERPL-MCNC: 149 MG/DL (ref 65–99)
HBA1C MFR BLD: 6.7 % (ref 4.8–5.6)
POTASSIUM SERPL-SCNC: 4 MMOL/L (ref 3.5–5.2)
SODIUM SERPL-SCNC: 139 MMOL/L (ref 134–144)

## 2018-04-15 DIAGNOSIS — I10 ESSENTIAL HYPERTENSION: ICD-10-CM

## 2018-04-15 RX ORDER — HYDROCHLOROTHIAZIDE 12.5 MG/1
CAPSULE ORAL
Qty: 30 CAP | Refills: 0 | Status: SHIPPED | OUTPATIENT
Start: 2018-04-15 | End: 2018-08-22 | Stop reason: SDUPTHER

## 2018-04-30 ENCOUNTER — OFFICE VISIT (OUTPATIENT)
Dept: FAMILY MEDICINE CLINIC | Age: 43
End: 2018-04-30

## 2018-04-30 VITALS
BODY MASS INDEX: 36.25 KG/M2 | DIASTOLIC BLOOD PRESSURE: 88 MMHG | SYSTOLIC BLOOD PRESSURE: 126 MMHG | TEMPERATURE: 97.7 F | WEIGHT: 217.6 LBS | HEIGHT: 65 IN | RESPIRATION RATE: 16 BRPM | OXYGEN SATURATION: 97 % | HEART RATE: 79 BPM

## 2018-04-30 DIAGNOSIS — E11.9 TYPE 2 DIABETES MELLITUS WITHOUT COMPLICATION, WITHOUT LONG-TERM CURRENT USE OF INSULIN (HCC): Primary | ICD-10-CM

## 2018-04-30 DIAGNOSIS — E66.9 CLASS 2 OBESITY WITH BODY MASS INDEX (BMI) OF 35.0 TO 35.9 IN ADULT, UNSPECIFIED OBESITY TYPE, UNSPECIFIED WHETHER SERIOUS COMORBIDITY PRESENT: ICD-10-CM

## 2018-04-30 LAB — GLUCOSE DOSE-GTT, POCT, GLDSPOCT: 124

## 2018-04-30 RX ORDER — INSULIN PUMP SYRINGE, 3 ML
EACH MISCELLANEOUS
Qty: 1 KIT | Refills: 0 | Status: SHIPPED | OUTPATIENT
Start: 2018-04-30

## 2018-04-30 NOTE — MR AVS SNAPSHOT
[address] 
[phone] Patient: Jonathan Augustine MRN: [id number] Visit Information Date & Time Provider Department Dept. Phone Encounter #  
 4/30/2018 10:30 AM Clary Tierney, [address] [phone] [id number] Follow-up Instructions Return in about 1 month (around 5/30/2018) for Diabetes labs check up. Upcoming Health Maintenance Date Due  
 EYE EXAM RETINAL OR DILATED Q1 10/10/2018* MICROALBUMIN Q1 8/14/2018 LIPID PANEL Q1 8/14/2018 HEMOGLOBIN A1C Q6M 9/26/2018 FOOT EXAM Q1 3/26/2019 PAP AKA CERVICAL CYTOLOGY 3/24/2020 DTaP/Tdap/Td series (2 - Td) 5/13/2026 *Topic was postponed. The date shown is not the original due date. Allergies as of 4/30/2018  Review Complete On: 4/30/2018 By: Pat Balbuena LPN No Known Allergies Current Immunizations  Never Reviewed No immunizations on file. Not reviewed this visit You Were Diagnosed With   
  
 Codes Comments Type 2 diabetes mellitus without complication, without long-term current use of insulin (HCC)    -  Primary ICD-10-CM: E11.9 ICD-9-CM: 250.00 Class 2 obesity with body mass index (BMI) of 35.0 to 35.9 in adult, unspecified obesity type, unspecified whether serious comorbidity present     ICD-10-CM: E66.9, Z68.35 ICD-9-CM: 278.00, V85.35 Vitals BP Pulse Temp Resp Height(growth percentile) Weight(growth percentile) 126/88 79 97.7 °F (36.5 °C) (Oral) 16 5' 5\" (1.651 m) 217 lb 9.6 oz (98.7 kg) SpO2 BMI OB Status Smoking Status 97% 36.21 kg/m2 Having regular periods Never Smoker Vitals History BMI and BSA Data Body Mass Index Body Surface Area  
 36.21 kg/m 2 2.13 m 2 [facility] [address], [address] [facility] [phone] Your Updated Medication List  
  
   
This list is accurate as of 4/30/18 11:00 AM.  Always use your most recent med list.  
  
  
  
  
 * Blood-Glucose Meter monitoring kit Check blood sugar before breakfast and 2 hours after lunch (heaviest meal of day) Reli on prefared * Blood-Glucose Meter monitoring kit Commonly known as:  CONTOUR NEXT EZ METER  
check  
  
 glucose blood VI test strips strip Commonly known as:  blood glucose test  
Check blood sugar before breakfast and 2 hours after lunch (heaviest meal of day)  
  
 hydroCHLOROthiazide 12.5 mg capsule Commonly known as:  Anny Chaparro TAKE ONE CAPSULE BY MOUTH EVERY DAY Lancets Misc Use as directed checking glucose BID  
  
 metFORMIN 500 mg tablet Commonly known as:  GLUCOPHAGE  
TAKE 1 TABLET BY MOUTH TWICE DAILY WITH MEALS * Notice: This list has 2 medication(s) that are the same as other medications prescribed for you. Read the directions carefully, and ask your doctor or other care provider to review them with you. Prescriptions Sent to Pharmacy Refills  
 glucose blood VI test strips (BLOOD GLUCOSE TEST) strip 11 Sig: Check blood sugar before breakfast and 2 hours after lunch (heaviest meal of day) Class: Normal  
 Pharmacy: [address] [place] [address] Ph #: [phone] Blood-Glucose Meter (CONTOUR NEXT EZ METER) monitoring kit 0 Sig: check Class: Normal  
 Pharmacy: [address] [place] [address] Ph #: [phone] We Performed the Following AMB POC GLUCOSE TEST [97970 CPT(R)] MICROALBUMIN, UR, RAND W/ MICROALB/CREAT RATIO [id number] CPT(R)] Follow-up Instructions Return in about 1 month (around 5/30/2018) for Diabetes labs check up. Patient Instructions Diabetes Blood Sugar Emergencies: Your Action Plan How can you prevent a blood sugar emergency? An important part of living with diabetes is keeping your blood sugar in your target range. You'll need to know what to do if it's too high or too low. Managing your blood sugar levels helps you avoid emergencies. This care sheet will teach you about the signs of high and low blood sugar. It will help you make an action plan with your doctor for when these signs occur. Low blood sugar is more likely to happen if you take certain medicines for diabetes. It can also happen if you skip a meal, drink alcohol, or exercise more than usual. 
You may get high blood sugar if you eat differently than you normally do. One example is eating more carbohydrate than usual. Having a cold, the flu, or other sudden illness can also cause high blood sugar levels. Levels can also rise if you miss a dose of medicine. Any change in how you take your medicine may affect your blood sugar level. So it's important to work with your doctor before you make any changes. Check your blood sugar Work with your doctor to fill in the blank spaces below that apply to you. Track your levels, know your target range, and write down ways you can get your blood sugar back in your target range. A log book can help you track your levels. Take the book to all of your medical appointments. · Check your blood sugar _____ times a day, at these times:________________________________________________. (For example: Before meals, at bedtime, before exercise, during exercise, other.) · Your blood sugar target range before a meal is ___________________. Your blood sugar target range after a meal is _______________________. · Do cnhe-___________________________________________________-le get your blood sugar back within your safe range if your blood sugar results are _________________________________________. (For example: Less than 70 or above 250 mg/dL.) Call your doctor when your blood sugar results are ___________________________________. (For example: Less than 70 or above 250 mg/dL.) What are the symptoms of low and high blood sugar? Common symptoms of low blood sugar are sweating and feeling shaky, weak, hungry, or confused. Symptoms can start quickly. Common symptoms of high blood sugar are feeling very thirsty or very hungry. You may also pass urine more often than usual. You may have blurry vision and may lose weight without trying. But some people may have high or low blood sugar without having any symptoms. That's a good reason to check your blood sugar on a regular schedule. What should you do if you have symptoms? Work with your doctor to fill in the blank spaces below that apply to you. Low blood sugar If you have symptoms of low blood sugar, check your blood sugar. If it's below _____ ( for example, below 70), eat or drink a quick-sugar food that has about 15 grams of carbohydrate. Your goal is to get your level back to your safe range. Check your blood sugar again 15 minutes later. If it's still not in your target range, take another 15 grams of carbohydrate and check your blood sugar again in 15 minutes. Repeat this until you reach your target. Then go back to your regular testing schedule. When you have low blood sugar, it's best to stop or reduce any physical activity until your blood sugar is back in your target range and is stable. If you must stay active, eat or drink 30 grams of carbohydrate. Then check your blood sugar again in 15 minutes. If it's not in your target range, take another 30 grams of carbohydrates. Check your blood sugar again in 15 minutes. Keep doing this until you reach your target. You can then go back to your regular testing schedule. If your symptoms or blood sugar levels are getting worse or have not improved after 15 minutes, seek medical care right away. Here are some examples of quick-sugar foods with 15 grams of carbohydrate: · 3 or 4 glucose tablets · 1 tube of glucose gel · Hard candy (such as 3 Jolly Ranchers or 5 to [employer]) · ½ cup to ¾ cup (4 to 6 ounces) of fruit juice or regular (not diet) soda High blood sugar If you have symptoms of high blood sugar, check your blood sugar. Your goal is to get your level back to your target range. If it's above ______ ( for example, above 250), follow these steps: · If you missed a dose of your diabetes medicine, take it now. Take only the amount of medicine that you have been prescribed. Do not take more or less medicine. · Give yourself insulin if your doctor has prescribed it for high blood sugar. · Test for ketones, if the doctor told you to do so. If the results of the ketone test show a moderate-to-large amount of ketones, call the doctor for advice. · Wait 30 minutes after you take the extra insulin or the missed medicine. Check your blood sugar again. If your symptoms or blood sugar levels are getting worse or have not improved after taking these steps, seek medical care right away. Follow-up care is a key part of your treatment and safety. Be sure to make and go to all appointments, and call your doctor if you are having problems. It's also a good idea to know your test results and keep a list of the medicines you take. Where can you learn more? Go to http://shayla-michael.info/. Enter E672 in the search box to learn more about \"Diabetes Blood Sugar Emergencies: Your Action Plan. \" Current as of: March 13, 2017 Content Version: 11.4 © 4441-4287 Healthwise, Incorporated. Care instructions adapted under license by [employer] (which disclaims liability or warranty for this information). If you have questions about a medical condition or this instruction, always ask your healthcare professional. [address] any warranty or liability for your use of this information. Learning About Meal Planning for Diabetes Why plan your meals? Meal planning can be a key part of managing diabetes. Planning meals and snacks with the right balance of carbohydrate, protein, and fat can help you keep your blood sugar at the target level you set with your doctor. You don't have to eat special foods. You can eat what your family eats, including sweets once in a while. But you do have to pay attention to how often you eat and how much you eat of certain foods. You may want to work with a dietitian or a certified diabetes educator. He or she can give you tips and meal ideas and can answer your questions about meal planning. This health professional can also help you reach a healthy weight if that is one of your goals. What plan is right for you? Your dietitian or diabetes educator may suggest that you start with the plate format or carbohydrate counting. The plate format The plate format is a simple way to help you manage how you eat. You plan meals by learning how much space each food should take on a plate. Using the plate format helps you spread carbohydrate throughout the day. It can make it easier to keep your blood sugar level within your target range. It also helps you see if you're eating healthy portion sizes. To use the plate format, you put non-starchy vegetables on half your plate. Add meat or meat substitutes on one-quarter of the plate. Put a grain or starchy vegetable (such as brown rice or a potato) on the final quarter of the plate. You can add a small piece of fruit and some low-fat or fat-free milk or yogurt, depending on your carbohydrate goal for each meal. 
Here are some tips for using the plate format: · Make sure that you are not using an oversized plate. A 9-inch plate is best. Many restaurants use larger plates. · Get used to using the plate format at home. Then you can use it when you eat out. · Write down your questions about using the plate format. Talk to your doctor, a dietitian, or a diabetes educator about your concerns. Carbohydrate counting With carbohydrate counting, you plan meals based on the amount of carbohydrate in each food. Carbohydrate raises blood sugar higher and more quickly than any other nutrient. It is found in desserts, breads and cereals, and fruit. It's also found in starchy vegetables such as potatoes and corn, grains such as rice and pasta, and milk and yogurt. Spreading carbohydrate throughout the day helps keep your blood sugar levels within your target range. Your daily amount depends on several things, including your weight, how active you are, which diabetes medicines you take, and what your goals are for your blood sugar levels. A registered dietitian or diabetes educator can help you plan how much carbohydrate to include in each meal and snack. A guideline for your daily amount of carbohydrate is: · 45 to 60 grams at each meal. That's about the same as 3 to 4 carbohydrate servings. · 15 to 20 grams at each snack. That's about the same as 1 carbohydrate serving. The Nutrition Facts label on packaged foods tells you how much carbohydrate is in a serving of the food. First, look at the serving size on the food label. Is that the amount you eat in a serving? All of the nutrition information on a food label is based on that serving size. So if you eat more or less than that, you'll need to adjust the other numbers. Total carbohydrate is the next thing you need to look for on the label. If you count carbohydrate servings, one serving of carbohydrate is 15 grams. For foods that don't come with labels, such as fresh fruits and vegetables, you'll need a guide that lists carbohydrate in these foods. Ask your doctor, dietitian, or diabetes educator about books or other nutrition guides you can use.  
If you take insulin, you need to know how many grams of carbohydrate are in a meal. This lets you know how much rapid-acting insulin to take before you eat. If you use an insulin pump, you get a constant rate of insulin during the day. So the pump must be programmed at meals to give you extra insulin to cover the rise in blood sugar after meals. When you know how much carbohydrate you will eat, you can take the right amount of insulin. Or, if you always use the same amount of insulin, you need to make sure that you eat the same amount of carbohydrate at meals. If you need more help to understand carbohydrate counting and food labels, ask your doctor, dietitian, or diabetes educator. How do you get started with meal planning? Here are some tips to get started: 
· Plan your meals a week at a time. Don't forget to include snacks too. · Use cookbooks or online recipes to plan several main meals. Plan some quick meals for busy nights. You also can double some recipes that freeze well. Then you can save half for other busy nights when you don't have time to cook. · Make sure you have the ingredients you need for your recipes. If you're running low on basic items, put these items on your shopping list too. · List foods that you use to make breakfasts, lunches, and snacks. List plenty of fruits and vegetables. · Post this list on the refrigerator. Add to it as you think of more things you need. · Take the list to the store to do your weekly shopping. Follow-up care is a key part of your treatment and safety. Be sure to make and go to all appointments, and call your doctor if you are having problems. It's also a good idea to know your test results and keep a list of the medicines you take. Where can you learn more? Go to http://shayla-michael.info/. Cruz Hodge in the search box to learn more about \"Learning About Meal Planning for Diabetes. \" Current as of: March 13, 2017 Content Version: 11.4 © 2859-5688 Healthwise, Incorporated.  Care instructions adapted under license by [employer] (which disclaims liability or warranty for this information). If you have questions about a medical condition or this instruction, always ask your healthcare professional. [address] any warranty or liability for your use of this information. Introducing [facility]! [employer] introduces [employer] patient portal. Now you can access parts of your medical record, email your doctor's office, and request medication refills online. 1. In your internet browser, go to [email or web address] 2. Click on the First Time User? Click Here link in the Sign In box. You will see the New Member Sign Up page. 3. Enter your [employer] Access Code exactly as it appears below. You will not need to use this code after youve completed the sign-up process. If you do not sign up before the expiration date, you must request a new code. · [employer] Access Code: [email or web address] Expires: 7/29/2018 11:00 AM 
 
4. Enter the last four digits of your Social Security Number (xxxx) and Date of Birth (mm/dd/yyyy) as indicated and click Submit. You will be taken to the next sign-up page. 5. Create a [employer] ID. This will be your [employer] login ID and cannot be changed, so think of one that is secure and easy to remember. 6. Create a [employer] password. You can change your password at any time. 7. Enter your Password Reset Question and Answer. This can be used at a later time if you forget your password. 8. Enter your e-mail address. You will receive e-mail notification when new information is available in [address] 9. Click Sign Up. You can now view and download portions of your medical record. 10. Click the Download Summary menu link to download a portable copy of your medical information. If you have questions, please visit the Frequently Asked Questions section of the [employer] website. Remember, [employer] is NOT to be used for urgent needs. For medical emergencies, dial 911. Now available from your iPhone and Android! Please provide this summary of care documentation to your next provider. Your primary care clinician is listed as Arleth Harvey. If you have any questions after today's visit, please call [phone].

## 2018-04-30 NOTE — PATIENT INSTRUCTIONS
Diabetes Blood Sugar Emergencies: Your Action Plan  How can you prevent a blood sugar emergency? An important part of living with diabetes is keeping your blood sugar in your target range. You'll need to know what to do if it's too high or too low. Managing your blood sugar levels helps you avoid emergencies. This care sheet will teach you about the signs of high and low blood sugar. It will help you make an action plan with your doctor for when these signs occur. Low blood sugar is more likely to happen if you take certain medicines for diabetes. It can also happen if you skip a meal, drink alcohol, or exercise more than usual.  You may get high blood sugar if you eat differently than you normally do. One example is eating more carbohydrate than usual. Having a cold, the flu, or other sudden illness can also cause high blood sugar levels. Levels can also rise if you miss a dose of medicine. Any change in how you take your medicine may affect your blood sugar level. So it's important to work with your doctor before you make any changes. Check your blood sugar  Work with your doctor to fill in the blank spaces below that apply to you. Track your levels, know your target range, and write down ways you can get your blood sugar back in your target range. A log book can help you track your levels. Take the book to all of your medical appointments. · Check your blood sugar _____ times a day, at these times:________________________________________________. (For example: Before meals, at bedtime, before exercise, during exercise, other.)  · Your blood sugar target range before a meal is ___________________. Your blood sugar target range after a meal is _______________________. · Do rbsw-___________________________________________________-fc get your blood sugar back within your safe range if your blood sugar results are _________________________________________.  (For example: Less than 70 or above 250 mg/dL.)  Call your doctor when your blood sugar results are ___________________________________. (For example: Less than 70 or above 250 mg/dL.)  What are the symptoms of low and high blood sugar? Common symptoms of low blood sugar are sweating and feeling shaky, weak, hungry, or confused. Symptoms can start quickly. Common symptoms of high blood sugar are feeling very thirsty or very hungry. You may also pass urine more often than usual. You may have blurry vision and may lose weight without trying. But some people may have high or low blood sugar without having any symptoms. That's a good reason to check your blood sugar on a regular schedule. What should you do if you have symptoms? Work with your doctor to fill in the blank spaces below that apply to you. Low blood sugar  If you have symptoms of low blood sugar, check your blood sugar. If it's below _____ ( for example, below 70), eat or drink a quick-sugar food that has about 15 grams of carbohydrate. Your goal is to get your level back to your safe range. Check your blood sugar again 15 minutes later. If it's still not in your target range, take another 15 grams of carbohydrate and check your blood sugar again in 15 minutes. Repeat this until you reach your target. Then go back to your regular testing schedule. When you have low blood sugar, it's best to stop or reduce any physical activity until your blood sugar is back in your target range and is stable. If you must stay active, eat or drink 30 grams of carbohydrate. Then check your blood sugar again in 15 minutes. If it's not in your target range, take another 30 grams of carbohydrates. Check your blood sugar again in 15 minutes. Keep doing this until you reach your target. You can then go back to your regular testing schedule. If your symptoms or blood sugar levels are getting worse or have not improved after 15 minutes, seek medical care right away.   Here are some examples of quick-sugar foods with 15 grams of carbohydrate:  · 3 or 4 glucose tablets  · 1 tube of glucose gel  · Hard candy (such as 3 Jolly Ranchers or 5 to 7 Life Savers)  · ½ cup to ¾ cup (4 to 6 ounces) of fruit juice or regular (not diet) soda  High blood sugar  If you have symptoms of high blood sugar, check your blood sugar. Your goal is to get your level back to your target range. If it's above ______ ( for example, above 250), follow these steps:  · If you missed a dose of your diabetes medicine, take it now. Take only the amount of medicine that you have been prescribed. Do not take more or less medicine. · Give yourself insulin if your doctor has prescribed it for high blood sugar. · Test for ketones, if the doctor told you to do so. If the results of the ketone test show a moderate-to-large amount of ketones, call the doctor for advice. · Wait 30 minutes after you take the extra insulin or the missed medicine. Check your blood sugar again. If your symptoms or blood sugar levels are getting worse or have not improved after taking these steps, seek medical care right away. Follow-up care is a key part of your treatment and safety. Be sure to make and go to all appointments, and call your doctor if you are having problems. It's also a good idea to know your test results and keep a list of the medicines you take. Where can you learn more? Go to http://shayla-michael.info/. Enter X899 in the search box to learn more about \"Diabetes Blood Sugar Emergencies: Your Action Plan. \"  Current as of: March 13, 2017  Content Version: 11.4  © 0979-9569 Healthwise, Incorporated. Care instructions adapted under license by [employer] (which disclaims liability or warranty for this information). If you have questions about a medical condition or this instruction, always ask your healthcare professional. [place] [address] any warranty or liability for your use of this information.        Learning About Meal Planning for Diabetes  Why plan your meals? Meal planning can be a key part of managing diabetes. Planning meals and snacks with the right balance of carbohydrate, protein, and fat can help you keep your blood sugar at the target level you set with your doctor. You don't have to eat special foods. You can eat what your family eats, including sweets once in a while. But you do have to pay attention to how often you eat and how much you eat of certain foods. You may want to work with a dietitian or a certified diabetes educator. He or she can give you tips and meal ideas and can answer your questions about meal planning. This health professional can also help you reach a healthy weight if that is one of your goals. What plan is right for you? Your dietitian or diabetes educator may suggest that you start with the plate format or carbohydrate counting. The plate format  The plate format is a simple way to help you manage how you eat. You plan meals by learning how much space each food should take on a plate. Using the plate format helps you spread carbohydrate throughout the day. It can make it easier to keep your blood sugar level within your target range. It also helps you see if you're eating healthy portion sizes. To use the plate format, you put non-starchy vegetables on half your plate. Add meat or meat substitutes on one-quarter of the plate. Put a grain or starchy vegetable (such as brown rice or a potato) on the final quarter of the plate. You can add a small piece of fruit and some low-fat or fat-free milk or yogurt, depending on your carbohydrate goal for each meal.  Here are some tips for using the plate format:  · Make sure that you are not using an oversized plate. A 9-inch plate is best. Many restaurants use larger plates. · Get used to using the plate format at home. Then you can use it when you eat out. · Write down your questions about using the plate format.  Talk to your doctor, a dietitian, or a diabetes educator about your concerns. Carbohydrate counting  With carbohydrate counting, you plan meals based on the amount of carbohydrate in each food. Carbohydrate raises blood sugar higher and more quickly than any other nutrient. It is found in desserts, breads and cereals, and fruit. It's also found in starchy vegetables such as potatoes and corn, grains such as rice and pasta, and milk and yogurt. Spreading carbohydrate throughout the day helps keep your blood sugar levels within your target range. Your daily amount depends on several things, including your weight, how active you are, which diabetes medicines you take, and what your goals are for your blood sugar levels. A registered dietitian or diabetes educator can help you plan how much carbohydrate to include in each meal and snack. A guideline for your daily amount of carbohydrate is:  · 45 to 60 grams at each meal. That's about the same as 3 to 4 carbohydrate servings. · 15 to 20 grams at each snack. That's about the same as 1 carbohydrate serving. The Nutrition Facts label on packaged foods tells you how much carbohydrate is in a serving of the food. First, look at the serving size on the food label. Is that the amount you eat in a serving? All of the nutrition information on a food label is based on that serving size. So if you eat more or less than that, you'll need to adjust the other numbers. Total carbohydrate is the next thing you need to look for on the label. If you count carbohydrate servings, one serving of carbohydrate is 15 grams. For foods that don't come with labels, such as fresh fruits and vegetables, you'll need a guide that lists carbohydrate in these foods. Ask your doctor, dietitian, or diabetes educator about books or other nutrition guides you can use. If you take insulin, you need to know how many grams of carbohydrate are in a meal. This lets you know how much rapid-acting insulin to take before you eat. If you use an insulin pump, you get a constant rate of insulin during the day. So the pump must be programmed at meals to give you extra insulin to cover the rise in blood sugar after meals. When you know how much carbohydrate you will eat, you can take the right amount of insulin. Or, if you always use the same amount of insulin, you need to make sure that you eat the same amount of carbohydrate at meals. If you need more help to understand carbohydrate counting and food labels, ask your doctor, dietitian, or diabetes educator. How do you get started with meal planning? Here are some tips to get started:  · Plan your meals a week at a time. Don't forget to include snacks too. · Use cookbooks or online recipes to plan several main meals. Plan some quick meals for busy nights. You also can double some recipes that freeze well. Then you can save half for other busy nights when you don't have time to cook. · Make sure you have the ingredients you need for your recipes. If you're running low on basic items, put these items on your shopping list too. · List foods that you use to make breakfasts, lunches, and snacks. List plenty of fruits and vegetables. · Post this list on the refrigerator. Add to it as you think of more things you need. · Take the list to the store to do your weekly shopping. Follow-up care is a key part of your treatment and safety. Be sure to make and go to all appointments, and call your doctor if you are having problems. It's also a good idea to know your test results and keep a list of the medicines you take. Where can you learn more? Go to http://shayla-michael.info/. Yris Miller in the search box to learn more about \"Learning About Meal Planning for Diabetes. \"  Current as of: March 13, 2017  Content Version: 11.4  © 4108-3099 Healthwise, Incorporated.  Care instructions adapted under license by [employer] (which disclaims liability or warranty for this information). If you have questions about a medical condition or this instruction, always ask your healthcare professional. [place] [address] any warranty or liability for your use of this information.

## 2018-04-30 NOTE — PROGRESS NOTES
I saw and evaluated the patient, performing the key elements of the service. I discussed the findings, assessment and plan with the resident and agree with the resident's findings and plan as documented in the resident's note. Advised to increase to metformin BID. Continue diet and exercise. RTC 3 months.

## 2018-04-30 NOTE — PROGRESS NOTES
[id number] [address] [facility],    [address]il[address] with [place] and Domingo Steen            [facility]     CC: \"My blood sugar is high\"    Magalie Lee is a 41 y.o. female who presents for elevated blood sugar for the past two days     Pt reports for the past 2 days her blood sugars were elevated  In 170s - 180s. After further explanation, she states she was not compliant with metformin 500mg bid . She would forget to take the evening dose. For the past 3-4 days, she has not been compliant with her diabetic diet. Pt does not exercise routinely,but is active at work. This morning she notice her blood sugars 180, and  took two Metformin 500mg tabs. Denies sx of polyuria,polyphagia,polydispsia,headache,vision changes,numbness,tingling.           PMHx:  Past Medical History:   Diagnosis Date    Allergic rhinitis     Diabetes mellitus ([facility] [address]     HTN (hypertension)     Diagnosed in ER in 1/2016 - on HCTZ - checking ambulatory BPs       Meds:   Current Outpatient Prescriptions   Medication Sig Dispense Refill    hydroCHLOROthiazide (MICROZIDE) 12.5 mg capsule TAKE ONE CAPSULE BY MOUTH EVERY DAY 30 Cap 0    metFORMIN (GLUCOPHAGE) 500 mg tablet TAKE 1 TABLET BY MOUTH TWICE DAILY WITH MEALS 180 Tab 0    Lancets misc Use as directed checking glucose  Each 4    glucose blood VI test strips (BLOOD GLUCOSE TEST) strip Check blood sugar before breakfast and 2 hours after lunch (heaviest meal of day) 50 Strip 11    Blood-Glucose Meter monitoring kit Check blood sugar before breakfast and 2 hours after lunch (heaviest meal of day)  Reli on prefared 1 Kit 0       Allergies:   No Known Allergies    Smoker:  History   Smoking Status    Never Smoker   Smokeless Tobacco    Never Used       ETOH:   History   Alcohol Use No       FH:   Family History   Problem Relation Age of Onset    Hypertension Mother     Diabetes Father     Hypertension Maternal Grandmother     Diabetes Brother ROS:  Review of Systems   Constitutional: Negative for activity change, appetite change, chills, fatigue and fever. Respiratory: Negative for chest tightness and shortness of breath. Cardiovascular: Negative for chest pain, palpitations and leg swelling. Gastrointestinal: Negative for abdominal pain, diarrhea, nausea and vomiting. Genitourinary: Negative for dysuria, flank pain and hematuria. Musculoskeletal: Negative for back pain and joint swelling. Neurological: Negative for dizziness, weakness and numbness. Psychiatric/Behavioral: Negative for confusion. Physical Exam:  Visit Vitals    /88    Pulse 79    Temp 97.7 °F (36.5 °C) (Oral)    Resp 16    Ht 5' 5\" (1.651 m)    Wt 217 lb 9.6 oz (98.7 kg)    SpO2 97%    BMI 36.21 kg/m2       Wt Readings from Last 3 Encounters:   04/30/18 217 lb 9.6 oz (98.7 kg)   03/26/18 215 lb (97.5 kg)   01/03/18 215 lb (97.5 kg)     BP Readings from Last 3 Encounters:   04/30/18 126/88   03/26/18 112/72   01/03/18 124/87        Physical Exam   Constitutional: She is oriented to person, place, and time. She appears well-developed and well-nourished. HENT:   Head: Atraumatic. Eyes: Conjunctivae and EOM are normal.   Neck: Neck supple. Cardiovascular: Normal rate, regular rhythm, normal heart sounds and intact distal pulses. Pulmonary/Chest: Effort normal and breath sounds normal. No respiratory distress. Musculoskeletal: She exhibits no edema. Neurological: She is alert and oriented to person, place, and time. No cranial nerve deficit. Sensation intact    Skin: Skin is warm. No erythema. Psychiatric: She has a normal mood and affect. Nursing note and vitals reviewed.            Assessment     41 y.o. female presents with history of:  Patient Active Problem List   Diagnosis Code    Allergic rhinitis J30.9    HTN (hypertension) I10    Diabetes mellitus ([facility] [address] E11.9    Obesity E66.9       Today's diagnoses are:    ICD-10-CM ICD-9-CM    1. Type 2 diabetes mellitus without complication, without long-term current use of insulin (HCC) E11.9 250.00 AMB POC GLUCOSE TEST   2. Class 2 obesity with body mass index (BMI) of 35.0 to 35.9 in adult, unspecified obesity type, unspecified whether serious comorbidity present E66.9 278.00     Z68.35 V85.35               Plan     ·  NIDDM type II :  presents for concerns of  elevate blood glucose readings,reports noncompliance of metformin and diet. 3/26/2018: Hgbn A1c 6.7   - POC glucose in clinic: 124   - Metformin 500mg bid    - Continue to check blood glucose twice a day     - Counseled pt on diabetic diet and exercise    - Follow up in 3 months for [id number],urine                          microalbumin     ·  HTN : at goal    - Continue HCTZ 12.5mg daily     · Obesity   - I have reviewed/discussed the above normal BMI with the patient. I have recommended the following interventions: dietary management education, guidance, and counseling, encourage exercise, monitor weight and prescribed dietary intake . Patient Care Team:  Rogers Aguirre MD as PCP - General (Family Practice)  Leticia Murrieta MD as Physician (Obstetrics & Gynecology)  Rex Teixeira MD as Physician [facility])    Follow-up Disposition:  Return in about 1 month (around 5/30/2018) for Diabetes labs check up. Prior labs and imaging were reviewed. I have discussed the diagnosis with the patient and the intended plan as seen in the above orders. The patient has received an after-visit summary and questions were answered concerning future plans. I have discussed medication side effects and warnings with the patient as well. Patient seen and discussed with Karen Godwin, Attending Physician.     Gena Monzon MD    [address] [place] [address]

## 2018-04-30 NOTE — PROGRESS NOTES
Chief Complaint   Patient presents with    High Blood Sugar     patient states glucose reading this morning 181     1. Have you been to the ER, urgent care clinic since your last visit? Hospitalized since your last visit? No    2. Have you seen or consulted any other health care providers outside of the [address] [place] [address] since your last visit? Include any pap smears or colon screening.  No

## 2018-07-22 DIAGNOSIS — E11.9 TYPE 2 DIABETES MELLITUS WITHOUT COMPLICATION, WITHOUT LONG-TERM CURRENT USE OF INSULIN (HCC): ICD-10-CM

## 2018-07-26 RX ORDER — METFORMIN HYDROCHLORIDE 500 MG/1
TABLET ORAL
Qty: 180 TAB | Refills: 0 | Status: SHIPPED | OUTPATIENT
Start: 2018-07-26 | End: 2018-12-10 | Stop reason: SDUPTHER

## 2018-08-22 DIAGNOSIS — I10 ESSENTIAL HYPERTENSION: ICD-10-CM

## 2018-08-22 RX ORDER — HYDROCHLOROTHIAZIDE 12.5 MG/1
CAPSULE ORAL
Qty: 30 CAP | Refills: 0 | Status: SHIPPED | OUTPATIENT
Start: 2018-08-22 | End: 2018-09-22 | Stop reason: SDUPTHER

## 2018-09-22 DIAGNOSIS — I10 ESSENTIAL HYPERTENSION: ICD-10-CM

## 2018-09-27 RX ORDER — HYDROCHLOROTHIAZIDE 12.5 MG/1
CAPSULE ORAL
Qty: 30 CAP | Refills: 0 | Status: SHIPPED | OUTPATIENT
Start: 2018-09-27 | End: 2018-09-28 | Stop reason: SDUPTHER

## 2018-09-28 ENCOUNTER — OFFICE VISIT (OUTPATIENT)
Dept: FAMILY MEDICINE CLINIC | Age: 43
End: 2018-09-28

## 2018-09-28 VITALS
WEIGHT: 214 LBS | OXYGEN SATURATION: 98 % | BODY MASS INDEX: 35.65 KG/M2 | RESPIRATION RATE: 17 BRPM | DIASTOLIC BLOOD PRESSURE: 80 MMHG | HEIGHT: 65 IN | SYSTOLIC BLOOD PRESSURE: 118 MMHG | HEART RATE: 71 BPM | TEMPERATURE: 98.4 F

## 2018-09-28 DIAGNOSIS — I10 ESSENTIAL HYPERTENSION: ICD-10-CM

## 2018-09-28 DIAGNOSIS — M62.838 NECK MUSCLE SPASM: ICD-10-CM
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
## 2018-09-28 RX ORDER — HYDROCHLOROTHIAZIDE 12.5 MG/1
CAPSULE ORAL
Qty: 90 CAP | Refills: 0 | Status: SHIPPED | OUTPATIENT
Start: 2018-09-28 | End: 2019-02-14 | Stop reason: SDUPTHER
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
## 2018-10-15 ENCOUNTER — HOSPITAL ENCOUNTER (OUTPATIENT)
Dept: PHYSICAL THERAPY | Age: 43
Discharge: HOME | End: 2018-10-15
Payer: MEDICAID

## 2018-10-15 PROCEDURE — 97014 ELECTRIC STIMULATION THERAPY: CPT | Performed by: PHYSICAL THERAPIST

## 2018-10-15 PROCEDURE — 97161 PT EVAL LOW COMPLEX 20 MIN: CPT | Performed by: PHYSICAL THERAPIST

## 2018-10-15 PROCEDURE — 97110 THERAPEUTIC EXERCISES: CPT | Performed by: PHYSICAL THERAPIST
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
## 2018-10-17 ENCOUNTER — HOSPITAL ENCOUNTER (OUTPATIENT)
Dept: PHYSICAL THERAPY | Age: 43
Discharge: HOME | End: 2018-10-17
Payer: MEDICAID

## 2018-10-17 PROCEDURE — 97110 THERAPEUTIC EXERCISES: CPT | Performed by: PHYSICAL MEDICINE & REHABILITATION

## 2018-10-17 PROCEDURE — 97014 ELECTRIC STIMULATION THERAPY: CPT | Performed by: PHYSICAL MEDICINE & REHABILITATION

## 2018-10-17 NOTE — PROGRESS NOTES
PT DAILY TREATMENT NOTE - [facility] 2-15 Patient Name: Kendy Monsalve Date:10/17/2018 : 1975 [x]  Patient  Verified Payor: Carlenesarthak Pinzon / Plan: [address] [place] [address] / Product Type: Managed Care Medicaid / In time:500 pm  Out time:555 pm 
Total Treatment Time (min): 55 Total Timed Codes (min): 40 
1:1 Treatment Time ( only): - Visit #: 2 Treatment Area: Neck pain [M54.2] SUBJECTIVE Pain Level (0-10 scale): 5-6/10 Any medication changes, allergies to medications, adverse drug reactions, diagnosis change, or new procedure performed?: [x] No    [] Yes (see summary sheet for update) Subjective functional status/changes:   [] No changes reported Patient reported that she continues to be very stiff throughout the neck and back. OBJECTIVE *no estim next visit, patient doesn't like, only heat Modality rationale: decrease inflammation, decrease pain and increase tissue extensibility to improve the patients ability to ADLs, work and standing and lifting tolerance Type Additional Details [x] Estim: []Att   [x]Unatt        []TENS instruct [x]IFC  []Premod   []NMES []Other:  []w/US   []w/ice   [x]w/heat Position: supine Location: neck []  Traction: [] Cervical       []Lumbar 
                     [] Prone          []Supine []Intermittent   []Continuous Lbs: 
[] before manual 
[] after manual 
[]w/heat  
[]  Ultrasound: []Continuous   [] Pulsed at: 
                         []1MHz   []3MHz Location: 
W/cm2:  
[] Paraffin Location:  
[]w/heat  
[]  Ice     []  Heat 
[]  Ice massage Position: Location:  
[]  Laser 
[]  Other: Position: Location:  
[]  Vasopneumatic Device Pressure:       [] lo [] med [] hi  
Temperature:   
 
[x] Skin assessment post-treatment:  [x]intact []redness- no adverse reaction 
  []redness  adverse reaction:  
 
40 min Therapeutic Exercise:  [x] See flow sheet :  
 Rationale: increase ROM, increase strength and improve coordination to improve the patients ability to ADLs, standing and lifting tolerance With 
 [x] TE 
 [] TA 
 [] neuro 
 [] other: Patient Education: [x] Review HEP [] Progressed/Changed HEP based on:  
[] positioning   [] body mechanics   [] transfers   [] heat/ice application   
[] other:   
 
Other Objective/Functional Measures: Mod fatigue with today's exercises. Pain Level (0-10 scale) post treatment: 2/10 ASSESSMENT/Changes in Function:  
 
Patient will continue to benefit from skilled PT services to modify and progress therapeutic interventions, address functional mobility deficits, address ROM deficits, address strength deficits, analyze and address soft tissue restrictions, analyze and cue movement patterns, analyze and modify body mechanics/ergonomics and assess and modify postural abnormalities to attain remaining goals. []  See Plan of Care 
[]  See progress note/recertification 
[]  See Discharge Summary Progress towards goals / Updated goals: 
Patient is showing good progress with goals with a decrease in pain with today's session. PLAN [x]  Upgrade activities as tolerated     [x]  Continue plan of care [x]  Update interventions per flow sheet      
[]  Discharge due to:_ 
[]  Other:_ Marlyn Roberto PTA, CPT 10/17/2018  5:27 PM

## 2018-10-22 ENCOUNTER — HOSPITAL ENCOUNTER (OUTPATIENT)
Dept: PHYSICAL THERAPY | Age: 43
Discharge: HOME | End: 2018-10-22
Payer: MEDICAID

## 2018-10-22 PROCEDURE — 97140 MANUAL THERAPY 1/> REGIONS: CPT

## 2018-10-22 PROCEDURE — 97110 THERAPEUTIC EXERCISES: CPT

## 2018-10-22 NOTE — PROGRESS NOTES
PT DAILY TREATMENT NOTE 2-15 Patient Name: Pierre Cruz Date:10/22/2018 : 1975 [x]  Patient  Verified Payor: Kym Mcclelland / Plan: [address] [place] [address] / Product Type: Managed Care Medicaid / In time:4:55p  Out time:6:00p Total Treatment Time (min): 65 Visit #: 3 Treatment Area: Neck pain [M54.2] SUBJECTIVE Pain Level (0-10 scale): 6 Any medication changes, allergies to medications, adverse drug reactions, diagnosis change, or new procedure performed?: [x] No    [] Yes (see summary sheet for update) Subjective functional status/changes:   [] No changes reported Patient reports she is stiff through the neck and back from work. Patient reports she feels better after performing her HEP. OBJECTIVE *no estim, pt does not like Modality rationale: decrease pain and increase tissue extensibility to improve the patients ability to sit, stand, lift, carry, reach and complete ADL's  
Min Type Additional Details  
 
 [] Estim: []Att   []Unatt    []TENS instruct []IFC  []Premod   []NMES []Other:  []w/US   []w/ice   []w/heat Position: Location:  
 
 []  Traction: [] Cervical       []Lumbar 
                     [] Prone          []Supine []Intermittent   []Continuous Lbs: 
[] before manual 
[] after manual 
[]w/heat  
 []  Ultrasound: []Continuous   [] Pulsed  
                    at: []1MHz   []3MHz Location: 
W/cm2:  
 [] Paraffin Location:  
[]w/heat  
15 []  Ice     [x]  Heat 
[]  Ice massage Position:supine Location: neck  
 []  Laser 
[]  Other: Position: Location:  
 
 []  Vasopneumatic Device Pressure:       [] lo [] med [] hi  
Temperature:   
[x] Skin assessment post-treatment:  [x]intact []redness- no adverse reaction 
  []redness  adverse reaction:  
 
40 min Therapeutic Exercise:  [x] See flow sheet :  
Rationale: increase ROM and increase strength to improve the patients ability to sit, stand, lift, carry, reach and complete ADL's 
 
10 min Manual Therapy:  MET to correct R anteriorly rotated innominate, MFR B UT, levator, cervical paraspinals Rationale: decrease pain, increase ROM, increase tissue extensibility and decrease trigger points  to improve the patients ability to sit, stand, lift, carry, reach and complete ADL's With 
 [] TE 
 [] TA 
 [] neuro 
 [] other: Patient Education: [x] Review HEP [] Progressed/Changed HEP based on:  
[] positioning   [] body mechanics   [] transfers   [] heat/ice application   
[] other:   
 
Other Objective/Functional Measures: Mod fatigue with advanced interventions Pain Level (0-10 scale) post treatment: 4 
 
ASSESSMENT/Changes in Function:  
 
Patient will continue to benefit from skilled PT services to modify and progress therapeutic interventions, address functional mobility deficits, address ROM deficits, address strength deficits, analyze and address soft tissue restrictions, analyze and cue movement patterns, analyze and modify body mechanics/ergonomics and assess and modify postural abnormalities to attain remaining goals. []  See Plan of Care 
[]  See progress note/recertification 
[]  See Discharge Summary Progress towards goals / Updated goals: 
Patient demonstrates good tolerance for interventions and will do well with continued slow progression as tolerated. Patient required verbal cues for postural awareness and mechanics. PLAN [x]  Upgrade activities as tolerated     [x]  Continue plan of care [x]  Update interventions per flow sheet      
[]  Discharge due to:_ 
[]  Other:_ Chelle Gray 10/22/2018  5:06 PM

## 2018-10-25 ENCOUNTER — HOSPITAL ENCOUNTER (OUTPATIENT)
Dept: PHYSICAL THERAPY | Age: 43
Discharge: HOME | End: 2018-10-25
Payer: MEDICAID

## 2018-10-25 PROCEDURE — 97140 MANUAL THERAPY 1/> REGIONS: CPT

## 2018-10-25 PROCEDURE — 97110 THERAPEUTIC EXERCISES: CPT

## 2018-10-25 NOTE — PROGRESS NOTES
PT DAILY TREATMENT NOTE 2-15 Patient Name: Santino Coombs Date:10/25/2018 : 1975 [x]  Patient  Verified Payor: Dorita Day / Plan: [address] [place] [address] / Product Type: Managed Care Medicaid / In time:4:10p  Out time:5:10p Total Treatment Time (min): 60 Visit #: 4 Treatment Area: Neck pain [M54.2] SUBJECTIVE Pain Level (0-10 scale): 6 Any medication changes, allergies to medications, adverse drug reactions, diagnosis change, or new procedure performed?: [x] No    [] Yes (see summary sheet for update) Subjective functional status/changes:   [] No changes reported Patient reports she is stiff through the neck and back from work. Patient reports she feels better after performing her HEP. OBJECTIVE *no estim, pt does not like Modality rationale: decrease pain and increase tissue extensibility to improve the patients ability to sit, stand, lift, carry, reach and complete ADL's  
Min Type Additional Details  
 
 [] Estim: []Att   []Unatt    []TENS instruct []IFC  []Premod   []NMES []Other:  []w/US   []w/ice   []w/heat Position: Location:  
 
 []  Traction: [] Cervical       []Lumbar 
                     [] Prone          []Supine []Intermittent   []Continuous Lbs: 
[] before manual 
[] after manual 
[]w/heat  
 []  Ultrasound: []Continuous   [] Pulsed  
                    at: []1MHz   []3MHz Location: 
W/cm2:  
 [] Paraffin Location:  
[]w/heat  
15 []  Ice     [x]  Heat 
[]  Ice massage Position:supine Location: neck  
 []  Laser 
[]  Other: Position: Location:  
 
 []  Vasopneumatic Device Pressure:       [] lo [] med [] hi  
Temperature:   
[x] Skin assessment post-treatment:  [x]intact []redness- no adverse reaction 
  []redness  adverse reaction:  
 
35 min Therapeutic Exercise:  [x] See flow sheet :  
Rationale: increase ROM and increase strength to improve the patients ability to sit, stand, lift, carry, reach and complete ADL's 
 
10 min Manual Therapy:  MET to correct R anteriorly rotated innominate, MFR B UT, levator, cervical paraspinals, SOR Rationale: decrease pain, increase ROM, increase tissue extensibility and decrease trigger points  to improve the patients ability to sit, stand, lift, carry, reach and complete ADL's With 
 [] TE 
 [] TA 
 [] neuro 
 [] other: Patient Education: [x] Review HEP [] Progressed/Changed HEP based on:  
[] positioning   [] body mechanics   [] transfers   [] heat/ice application   
[] other:   
 
Other Objective/Functional Measures:  decreased fatigue compared to last session with advanced interventions Pain Level (0-10 scale) post treatment: 3/10 ASSESSMENT/Changes in Function:  
 
Patient will continue to benefit from skilled PT services to modify and progress therapeutic interventions, address functional mobility deficits, address ROM deficits, address strength deficits, analyze and address soft tissue restrictions, analyze and cue movement patterns, analyze and modify body mechanics/ergonomics and assess and modify postural abnormalities to attain remaining goals. []  See Plan of Care 
[]  See progress note/recertification 
[]  See Discharge Summary Progress towards goals / Updated goals: 
Patient demonstrates good tolerance for interventions and will do well with continued slow progression as tolerated. Patient required verbal cues for postural awareness and mechanics. PLAN [x]  Upgrade activities as tolerated     [x]  Continue plan of care [x]  Update interventions per flow sheet      
[]  Discharge due to:_ 
[]  Other:_ Shae Velasquez 10/25/2018  5:06 PM

## 2018-10-30 ENCOUNTER — HOSPITAL ENCOUNTER (OUTPATIENT)
Dept: PHYSICAL THERAPY | Age: 43
Discharge: HOME | End: 2018-10-30
Payer: MEDICAID

## 2018-10-30 PROCEDURE — 97110 THERAPEUTIC EXERCISES: CPT

## 2018-10-30 PROCEDURE — 97140 MANUAL THERAPY 1/> REGIONS: CPT

## 2018-10-30 NOTE — PROGRESS NOTES
PT DAILY TREATMENT NOTE 2-15 Patient Name: Ana Rivero Date:10/30/2018 : 1975 [x]  Patient  Verified Payor: Moodypamela Mike / Plan: [address] [place] [address] / Product Type: Managed Care Medicaid / In time:4:00p  Out time:5:00p Total Treatment Time (min): 60 Visit #: 1 Treatment Area: Neck pain [M54.2] SUBJECTIVE Pain Level (0-10 scale): 6 Any medication changes, allergies to medications, adverse drug reactions, diagnosis change, or new procedure performed?: [x] No    [] Yes (see summary sheet for update) Subjective functional status/changes:   [] No changes reported Patient reports she is stiff through the neck and back from work. Patient reports she feels better after performing her HEP. OBJECTIVE *no estim, pt does not like Modality rationale: decrease pain and increase tissue extensibility to improve the patients ability to sit, stand, lift, carry, reach and complete ADL's  
Min Type Additional Details  
 
 [] Estim: []Att   []Unatt    []TENS instruct []IFC  []Premod   []NMES []Other:  []w/US   []w/ice   []w/heat Position: Location:  
 
 []  Traction: [] Cervical       []Lumbar 
                     [] Prone          []Supine []Intermittent   []Continuous Lbs: 
[] before manual 
[] after manual 
[]w/heat  
 []  Ultrasound: []Continuous   [] Pulsed  
                    at: []1MHz   []3MHz Location: 
W/cm2:  
 [] Paraffin Location:  
[]w/heat  
15 []  Ice     [x]  Heat 
[]  Ice massage Position:supine Location: neck  
 []  Laser 
[]  Other: Position: Location:  
 
 []  Vasopneumatic Device Pressure:       [] lo [] med [] hi  
Temperature:   
[x] Skin assessment post-treatment:  [x]intact []redness- no adverse reaction 
  []redness  adverse reaction:  
 
35 min Therapeutic Exercise:  [x] See flow sheet :  
Rationale: increase ROM and increase strength to improve the patients ability to sit, stand, lift, carry, reach and complete ADL's 
 
10 min Manual Therapy: MFR B UT, levator, SOR, infraclavicular pumping Rationale: decrease pain, increase ROM, increase tissue extensibility and decrease trigger points  to improve the patients ability to sit, stand, lift, carry, reach and complete ADL's With 
 [] TE 
 [] TA 
 [] neuro 
 [] other: Patient Education: [x] Review HEP [] Progressed/Changed HEP based on:  
[] positioning   [] body mechanics   [] transfers   [] heat/ice application   
[] other:   
 
Other Objective/Functional Measures:   
Pain Level (0-10 scale) post treatment: 3/10 ASSESSMENT/Changes in Function: Pt with decreased fatigue compared to last session with advanced interventions. Pt continues to be limited with cervical AROM and continues to demonstrate increased guarding with all interventions requiring verbal cues. Patient instructed in gait mechanicsto allow increased arm swing and trunk rotation. Patient will continue to benefit from skilled PT services to modify and progress therapeutic interventions, address functional mobility deficits, address ROM deficits, address strength deficits, analyze and address soft tissue restrictions, analyze and cue movement patterns, analyze and modify body mechanics/ergonomics and assess and modify postural abnormalities to attain remaining goals. []  See Plan of Care 
[]  See progress note/recertification 
[]  See Discharge Summary Progress towards goals / Updated goals: 
Patient demonstrates good tolerance for interventions and will do well with continued slow progression as tolerated. Patient required verbal cues for postural awareness and mechanics. PLAN [x]  Upgrade activities as tolerated     [x]  Continue plan of care [x]  Update interventions per flow sheet      
[]  Discharge due to:_ 
[]  Other:_ Delma Post 10/30/2018  5:06 PM

## 2018-11-01 ENCOUNTER — APPOINTMENT (OUTPATIENT)
Dept: PHYSICAL THERAPY | Age: 43
End: 2018-11-01
Payer: MEDICAID

## 2018-11-05 ENCOUNTER — HOSPITAL ENCOUNTER (OUTPATIENT)
Dept: PHYSICAL THERAPY | Age: 43
Discharge: HOME | End: 2018-11-05
Payer: MEDICAID

## 2018-11-05 PROCEDURE — 97140 MANUAL THERAPY 1/> REGIONS: CPT | Performed by: PHYSICAL MEDICINE & REHABILITATION

## 2018-11-05 PROCEDURE — 97110 THERAPEUTIC EXERCISES: CPT | Performed by: PHYSICAL MEDICINE & REHABILITATION

## 2018-11-08 ENCOUNTER — HOSPITAL ENCOUNTER (OUTPATIENT)
Dept: PHYSICAL THERAPY | Age: 43
Discharge: HOME | End: 2018-11-08
Payer: MEDICAID

## 2018-11-08 PROCEDURE — 97110 THERAPEUTIC EXERCISES: CPT

## 2018-11-08 PROCEDURE — 97140 MANUAL THERAPY 1/> REGIONS: CPT

## 2018-11-08 NOTE — PROGRESS NOTES
PT DAILY TREATMENT NOTE 2-15 Patient Name: Sandi Alcantara Date:2018 : 1975 [x]  Patient  Verified Payor: Noelle Waterman / Plan: [address] [place] [address] / Product Type: Managed Care Medicaid / In time:5:30p  Out time: 6:30p Total Treatment Time (min): 60 Visit #: 9 Treatment Area: Neck pain [M54.2] SUBJECTIVE Pain Level (0-10 scale): 1 \"sore\" Any medication changes, allergies to medications, adverse drug reactions, diagnosis change, or new procedure performed?: [x] No    [] Yes (see summary sheet for update) Subjective functional status/changes:   [] No changes reported Patient reports she continues to feel better everyday and is just a little sore today. OBJECTIVE *no estim, pt does not like Modality rationale: decrease pain and increase tissue extensibility to improve the patients ability to sit, stand, lift, carry, reach and complete ADL's  
Min Type Additional Details  
 
 [] Estim: []Att   []Unatt    []TENS instruct []IFC  []Premod   []NMES []Other:  []w/US   []w/ice   []w/heat Position: Location:  
 
 []  Traction: [] Cervical       []Lumbar 
                     [] Prone          []Supine []Intermittent   []Continuous Lbs: 
[] before manual 
[] after manual 
[]w/heat  
 []  Ultrasound: []Continuous   [] Pulsed  
                    at: []1MHz   []3MHz Location: 
W/cm2:  
 [] Paraffin Location:  
[]w/heat  
15 []  Ice     [x]  Heat 
[]  Ice massage Position:supine Location: neck  
 []  Laser 
[]  Other: Position: Location:  
 
 []  Vasopneumatic Device Pressure:       [] lo [] med [] hi  
Temperature:   
[x] Skin assessment post-treatment:  [x]intact []redness- no adverse reaction 
  []redness  adverse reaction:  
 
35 min Therapeutic Exercise:  [x] See flow sheet :  
Rationale: increase ROM and increase strength to improve the patients ability to sit, stand, lift, carry, reach and complete ADL's 
 
10 min Manual Therapy: bebeto PADILLA. Rationale: decrease pain, increase ROM, increase tissue extensibility and decrease trigger points  to improve the patients ability to sit, stand, lift, carry, reach and complete ADL's With 
 [] TE 
 [] TA 
 [] neuro 
 [] other: Patient Education: [x] Review HEP [] Progressed/Changed HEP based on:  
[] positioning   [] body mechanics   [] transfers   [] heat/ice application   
[] other:   
 
Other Objective/Functional Measures:   
 
Pain Level (0-10 scale) post treatment: 0/10 ASSESSMENT/Changes in Function:  
 
 
Patient will continue to benefit from skilled PT services to modify and progress therapeutic interventions, address functional mobility deficits, address ROM deficits, address strength deficits, analyze and address soft tissue restrictions, analyze and cue movement patterns, analyze and modify body mechanics/ergonomics and assess and modify postural abnormalities to attain remaining goals. []  See Plan of Care 
[]  See progress note/recertification 
[]  See Discharge Summary Progress towards goals / Updated goals: 
Patient demonstrates good tolerance for interventions and will do well with continued slow progression as tolerated. Patient required verbal cues for postural awareness and mechanics. PLAN [x]  Upgrade activities as tolerated     [x]  Continue plan of care [x]  Update interventions per flow sheet      
[]  Discharge due to:_ 
[]  Other:_ Aureliano Valladares, PTA 11/8/2018  5:06 PM

## 2018-11-15 ENCOUNTER — APPOINTMENT (OUTPATIENT)
Dept: PHYSICAL THERAPY | Age: 43
End: 2018-11-15
Payer: MEDICAID

## 2018-11-19 ENCOUNTER — HOSPITAL ENCOUNTER (OUTPATIENT)
Dept: PHYSICAL THERAPY | Age: 43
Discharge: HOME | End: 2018-11-19
Payer: MEDICAID

## 2018-11-19 PROCEDURE — 97110 THERAPEUTIC EXERCISES: CPT

## 2018-11-19 NOTE — PROGRESS NOTES
PT DAILY TREATMENT NOTE 2-15 Patient Name: Charis Ortiz Date:2018 : 1975 [x]  Patient  Verified Payor: Kelley Moore / Plan: [facility] / Product Type: Managed Care Medicaid / In time:5:30p  Out time: 6:30p Total Treatment Time (min): 60 Visit #: 2 Treatment Area: Cervicalgia [M54.2] SUBJECTIVE Pain Level (0-10 scale): 1 \"sore\" Any medication changes, allergies to medications, adverse drug reactions, diagnosis change, or new procedure performed?: [x] No    [] Yes (see summary sheet for update) Subjective functional status/changes:   [] No changes reported Patient reports she continues to feel better everyday and is just a little sore today. Patient reports she wants to try going on her own. OBJECTIVE 60 min Therapeutic Exercise:  [x] See flow sheet :  
Rationale: increase ROM and increase strength to improve the patients ability to sit, stand, lift, carry, reach and complete ADL's With 
 [] TE 
 [] TA 
 [] neuro 
 [] other: Patient Education: [x] Review HEP [] Progressed/Changed HEP based on:  
[x] positioning   [x] body mechanics   [] transfers   [] heat/ice application   
[] other:   
 
Other Objective/Functional Measures: UPPER QUARTER MUSCLE STRENGTH 
    R  L 
C1, C2 Neck Flex   5  
C3 Side Flex   5  5 C4 Sh Elev   5  5 C5 Deltoid/Biceps  4+  5 C6 Wrist Ext   4+  5 C7 Triceps   4+  5 C8 Thumb Ext   4+  5 T1 Hand Inst   4+  5 Antonio Inch Cervical ROM: 
 
Flexion :    25 Extension:    40 SB:    35  35 Rot:    70  70 Shoulder flexion :  170  170 Shoulder Abduction:  170  170 Pain Level (0-10 scale) post treatment: 0/10 ASSESSMENT/Changes in Function:  
 
 
  
[]  See Plan of Care 
[]  See progress note/recertification 
[x]  See Discharge Summary Progress towards goals / Updated goals: 
 
Short Term Goals:  To be accomplished in 4 weeks: 
1) The patient will be independent with introductory HEP 
 2) The patient will improve cervical rotation L to 35 dg to improve safety with driving Met 3) The patient will improve cervical flexion to 25 deg to improve ease with reading Met 4) The patient will improve R shoulder flexion AROM to 115 deg to improve ease with reaching technques Met Long Term Goals: To be accomplished in 12 weeks: 
1) The patient will report ability to complete household chores without an increase in pain Met 
2) The patient will report ability to complete work tasks without an increase in pain Met 3) The patient will report ability to complete  duties without an increase in pain Met Frequency / Duration: Patient to be seen 2 times per week for 12 weeks. PLAN 
[]  Upgrade activities as tolerated     []  Continue plan of care 
[]  Update interventions per flow sheet [x]  Discharge due to:_ 
[]  Other:_ Chelle Gray PTA 11/19/2018  5:06 PM

## 2018-11-20 NOTE — ANCILLARY DISCHARGE INSTRUCTIONS
[employer] [facility] [address] [facility], [address] Phone: [phone]  Fax: [phone] Medicaid Discharge Summary  2-15 Patient name: Karine Pickett  : 1975  Provider#: [id number] Referral source: Ramesh Alarcon MD     
Medical/Treatment Diagnosis: Cervicalgia [M54.2] Prior Hospitalization: see medical history Comorbidities: See Plan of Care Prior Level of Function:See Plan of Care Medications: Verified on Patient Summary List 
 
Start of Care: 10/15/2018                                                                   Onset Date:2018 Visits from Start of Care: 8                                        Missed Visits: 2 Reporting Period : 10/15/2018 to 2018 
  
  
ASSESSMENT/SUMMARY OF CARE:  The patient has completed eight physical therapy visits and has met all short and long term goals. The patient scored a 53% on the FOTO survey, a significant improvement from initial evaluation score of 42%. The patient has maximized therapeutic benefit at this time and is ready for discharge to independent HEP.    
Short Term Goals: To be accomplished in 4 weeks: 
1) The patient will be independent with introductory HEP 2) The patient will improve cervical rotation L to 35 dg to improve safety with driving Met 3) The patient will improve cervical flexion to 25 deg to improve ease with reading Met 4) The patient will improve R shoulder flexion AROM to 115 deg to improve ease with reaching technques Met Long Term Goals: To be accomplished in 12 weeks: 
1) The patient will report ability to complete household chores without an increase in pain Met 
2) The patient will report ability to complete work tasks without an increase in pain Met 3) The patient will report ability to complete  duties without an increase in pain Met 
  
  
RECOMMENDATIONS: 
 [x]Discontinue therapy: [x]Patient has reached or is progressing toward set goals []Patient is non-compliant or has abdicated 
    []Due to lack of appreciable progress towards set goals []Other Betzaida Najera, PT 11/20/2018 8:44 AM 
 
 
______________________________________________________________________ NOTE TO PHYSICIAN:  Please complete the following and fax to: [facility]: [phone] Retain this original for your records. If you are unable to process this request in 24 hours, please contact our office. [de-identified] Signature:____________________  Date:____________Time:_________

## 2018-12-03 ENCOUNTER — TELEPHONE (OUTPATIENT)
Dept: FAMILY MEDICINE CLINIC | Age: 43
End: 2018-12-03

## 2018-12-03 NOTE — TELEPHONE ENCOUNTER
Called and left voice mail regarding this request as to ask the patient if the pharmacy had contacted the patient.

## 2018-12-03 NOTE — TELEPHONE ENCOUNTER
----- Message from Robyn Amos sent at 12/3/2018  2:33 PM EST -----  Regarding: Dr. Lamberto Koroma would like a call back in reference to the recall on her Hydrochlorothiazide bp medication.  Phone: [phone]

## 2018-12-06 NOTE — TELEPHONE ENCOUNTER
Two patient identifiers confirmed (name and ). Spoke to patient regarding recall on Hydrochlorothiazide. Notified patient she should contact pharmacy. Patient states she did contact pharmacy regarding recall on Hydrochlorothiazide. Patient states pharmacy stated she has not been affected by recall on Hydrochlorothiazide. Patient requested to schedule appointment for Metformin refill.  Appointment scheduled 12/10/18 with

## 2018-12-10 ENCOUNTER — OFFICE VISIT (OUTPATIENT)
Dept: FAMILY MEDICINE CLINIC | Age: 43
End: 2018-12-10

## 2018-12-10 VITALS
DIASTOLIC BLOOD PRESSURE: 80 MMHG | HEART RATE: 70 BPM | OXYGEN SATURATION: 98 % | SYSTOLIC BLOOD PRESSURE: 129 MMHG | RESPIRATION RATE: 18 BRPM | WEIGHT: 219 LBS | HEIGHT: 65 IN | TEMPERATURE: 98.5 F | BODY MASS INDEX: 36.49 KG/M2

## 2018-12-10 DIAGNOSIS — J01.40 ACUTE NON-RECURRENT PANSINUSITIS: ICD-10-CM

## 2018-12-10 DIAGNOSIS — E66.01 SEVERE OBESITY (HCC): ICD-10-CM

## 2018-12-10 DIAGNOSIS — I10 ESSENTIAL HYPERTENSION: ICD-10-CM

## 2018-12-10 DIAGNOSIS — E11.9 TYPE 2 DIABETES MELLITUS WITHOUT COMPLICATION, WITHOUT LONG-TERM CURRENT USE OF INSULIN (HCC): Primary | ICD-10-CM

## 2018-12-10 PROBLEM — K21.9 GERD (GASTROESOPHAGEAL REFLUX DISEASE): Status: ACTIVE | Noted: 2018-12-10

## 2018-12-10 LAB
ALBUMIN UR QL STRIP: 10 MG/L
CREATININE, URINE POC: 200 MG/DL
MICROALBUMIN/CREAT RATIO POC: <30 MG/G

## 2018-12-10 RX ORDER — METFORMIN HYDROCHLORIDE 500 MG/1
TABLET ORAL
Qty: 180 TAB | Refills: 3 | Status: SHIPPED | OUTPATIENT
Start: 2018-12-10 | End: 2019-06-20 | Stop reason: SDUPTHER

## 2018-12-10 RX ORDER — AMOXICILLIN AND CLAVULANATE POTASSIUM 875; 125 MG/1; MG/1
1 TABLET, FILM COATED ORAL EVERY 12 HOURS
Qty: 20 TAB | Refills: 0 | Status: SHIPPED | OUTPATIENT
Start: 2018-12-10 | End: 2018-12-20

## 2018-12-10 NOTE — PROGRESS NOTES
Chief Complaint   Patient presents with    Diabetes    Cough     cough x 2 days     1. Have you been to the ER, urgent care clinic since your last visit? Hospitalized since your last visit? No    2. Have you seen or consulted any other health care providers outside of the [address] [place] [address] since your last visit? Include any pap smears or colon screening. No     Reviewed record in preparation for visit and have obtained necessary documentation.

## 2018-12-10 NOTE — PROGRESS NOTES
History of Present Illness:     Chief Complaint   Patient presents with    Diabetes    Cough     cough x 2 days     Pt is a 41y.o. year old female    Presents to clinic for diabetes follow up. DM:  Last A1c 6.7% in March 2018  Currently taking Metformin 500mg BID  Due for A1c, lipid panel, urine micro albumin  Last eye exam 1 year ago; due to go   Fasting BGs running 120s  2 hours post prandial 220s    HTN:  BPs well controlled on HCTZ    BMI 36:  Cutting back on certain foods that run her sugars up  Does not exercise so much    Cough:  1 week ago started with scratchy throat  Noticed she was fatigued and off balanced  Low energy, body aches about 4 days ago  Cough worse at night  Voice gone this AM  Tightness in chest this AM  Warm shower helped  Using Robatussin and Vicks to help    Past Medical History:   Diagnosis Date    Allergic rhinitis     Diabetes mellitus ([address]     HTN (hypertension)     Diagnosed in ER in 1/2016 - on HCTZ - checking ambulatory BPs         Current Outpatient Medications on File Prior to Visit   Medication Sig Dispense Refill    hydroCHLOROthiazide (MICROZIDE) 12.5 mg capsule TAKE 1 CAPSULE BY MOUTH EVERY DAY 90 Cap 0    glucose blood VI test strips (BLOOD GLUCOSE TEST) strip Check blood sugar before breakfast and 2 hours after lunch (heaviest meal of day) 50 Strip 11    Blood-Glucose Meter (CONTOUR NEXT EZ METER) monitoring kit check 1 Kit 0    Lancets misc Use as directed checking glucose  Each 4    Blood-Glucose Meter monitoring kit Check blood sugar before breakfast and 2 hours after lunch (heaviest meal of day)  Reli on prefared 1 Kit 0     No current facility-administered medications on file prior to visit. Allergies:   Allergies   Allergen Reactions    Peanut Itching         Review of Systems:  Denies fever, chills, sweats  +Congestion, facial fullness  Denies chest pain, FERRARI, palpitations, LE edema  + cough, pain with coughing  Denies sputum production, SOB, pleuritic chest pain, wheezing      Objective:     Vitals:    12/10/18 1544   BP: 129/80   Pulse: 70   Resp: 18   Temp: 98.5 °F (36.9 °C)   TempSrc: Oral   SpO2: 98%   Weight: 219 lb (99.3 kg)   Height: 5' 5\" (1.651 m)       Physical Exam:  General appearance - alert, well appearing, and in no distress and overweight  Ears - bilateral TM's and external ear canals normal  Nose - mucosal congestion, mucosal erythema and sinus tenderness noted under eyes  Mouth - mucous membranes moist, pharynx normal without lesions and erythematous  Chest - clear to auscultation, no wheezes, rales or rhonchi, symmetric air entry  Heart - normal rate, regular rhythm, normal S1, S2, no murmurs, rubs, clicks or gallops      Recent Labs:  No results found for this or any previous visit (from the past 12 hour(s)). Assessment and Plan:   Pt is a 41y.o. year old female,      ICD-10-CM ICD-9-CM    1. Type 2 diabetes mellitus without complication, without long-term current use of insulin (HCC) E11.9 250.00 CBC W/O DIFF      METABOLIC PANEL, BASIC      LIPID PANEL      metFORMIN (GLUCOPHAGE) 500 mg tablet      HEMOGLOBIN A1C WITH EAG      AMB POC URINE, MICROALBUMIN, SEMIQUANT (3 RESULTS)   2. Essential hypertension I10 401.9    3. Severe obesity ([address] E66.01 278.01    4.  Acute non-recurrent pansinusitis J01.40 461.8 amoxicillin-clavulanate (AUGMENTIN) 875-125 mg per tablet     DM:  - Refilled Metformin   - Due for DM eye exam; already established with ophtho  - A1c, lipids, urine microalbumin today    HTN:  - Continue HCTZ    Obesity:  - Discussed weight/ BMI  - Counseled on low carb, low sugar diet    Sinusitis/ cough:  - Start Augmentin in 2-3 days if no improvement in symptoms  - Mucinex PRN cough and congestion  - Recommended Flonase for post nasal drip    Follow up in 3 months for DM follow up    Warren Rosales MD      I have discussed the diagnosis with the patient and the intended plan as seen in the above orders. The patient has received an after-visit summary and questions were answered concerning future plans.

## 2018-12-10 NOTE — PATIENT INSTRUCTIONS
1. Flonase for congestion and post nasal drip    2. You can use Mucinex - DM for cough and nasal drainage    3. An antibiotic was sent to the pharmacy. Please start taking if no improvement in your symptoms    Sinusitis: Care Instructions  Your Care Instructions    Sinusitis is an infection of the lining of the sinus cavities in your head. Sinusitis often follows a cold. It causes pain and pressure in your head and face. In most cases, sinusitis gets better on its own in 1 to 2 weeks. But some mild symptoms may last for several weeks. Sometimes antibiotics are needed. Follow-up care is a key part of your treatment and safety. Be sure to make and go to all appointments, and call your doctor if you are having problems. It's also a good idea to know your test results and keep a list of the medicines you take. How can you care for yourself at home? · Take an over-the-counter pain medicine, such as acetaminophen (Tylenol), ibuprofen (Advil, Motrin), or naproxen (Aleve). Read and follow all instructions on the label. · If the doctor prescribed antibiotics, take them as directed. Do not stop taking them just because you feel better. You need to take the full course of antibiotics. · Be careful when taking over-the-counter cold or flu medicines and Tylenol at the same time. Many of these medicines have acetaminophen, which is Tylenol. Read the labels to make sure that you are not taking more than the recommended dose. Too much acetaminophen (Tylenol) can be harmful. · Breathe warm, moist air from a steamy shower, a hot bath, or a sink filled with hot water. Avoid cold, dry air. Using a humidifier in your home may help. Follow the directions for cleaning the machine. · Use saline (saltwater) nasal washes to help keep your nasal passages open and wash out mucus and bacteria. You can buy saline nose drops at a grocery store or drugstore.  Or you can make your own at home by adding 1 teaspoon of salt and 1 teaspoon of baking soda to 2 cups of distilled water. If you make your own, fill a bulb syringe with the solution, insert the tip into your nostril, and squeeze gently. [address] your nose. · Put a hot, wet towel or a warm gel pack on your face 3 or 4 times a day for 5 to 10 minutes each time. · Try a decongestant nasal spray like oxymetazoline (Afrin). Do not use it for more than 3 days in a row. Using it for more than 3 days can make your congestion worse. When should you call for help? Call your doctor now or seek immediate medical care if:    · You have new or worse swelling or redness in your face or around your eyes.     · You have a new or higher fever.    Watch closely for changes in your health, and be sure to contact your doctor if:    · You have new or worse facial pain.     · The mucus from your nose becomes thicker (like pus) or has new blood in it.     · You are not getting better as expected. Where can you learn more? Go to http://shayla-michael.info/. Enter K606 in the search box to learn more about \"Sinusitis: Care Instructions. \"  Current as of: March 28, 2018  Content Version: 11.8  © 8034-7311 [employer]. Care instructions adapted under license by [employer] (which disclaims liability or warranty for this information). If you have questions about a medical condition or this instruction, always ask your healthcare professional. [place] [address] any warranty or liability for your use of this information.

## 2018-12-14 LAB
BUN SERPL-MCNC: 9 MG/DL (ref 6–24)
BUN/CREAT SERPL: 16 (ref 9–23)
CALCIUM SERPL-MCNC: 9.6 MG/DL (ref 8.7–10.2)
CHLORIDE SERPL-SCNC: 96 MMOL/L (ref 96–106)
CHOLEST SERPL-MCNC: 172 MG/DL (ref 100–199)
CO2 SERPL-SCNC: 26 MMOL/L (ref 20–29)
CREAT SERPL-MCNC: 0.56 MG/DL (ref 0.57–1)
ERYTHROCYTE [DISTWIDTH] IN BLOOD BY AUTOMATED COUNT: 14.3 % (ref 12.3–15.4)
EST. AVERAGE GLUCOSE BLD GHB EST-MCNC: 146 MG/DL
GLUCOSE SERPL-MCNC: 169 MG/DL (ref 65–99)
HBA1C MFR BLD: 6.7 % (ref 4.8–5.6)
HCT VFR BLD AUTO: 39.3 % (ref 34–46.6)
HDLC SERPL-MCNC: 47 MG/DL
HGB BLD-MCNC: 13.4 G/DL (ref 11.1–15.9)
INTERPRETATION, 910389: NORMAL
LDLC SERPL CALC-MCNC: 99 MG/DL (ref 0–99)
Lab: NORMAL
MCH RBC QN AUTO: 28.6 PG (ref 26.6–33)
MCHC RBC AUTO-ENTMCNC: 34.1 G/DL (ref 31.5–35.7)
MCV RBC AUTO: 84 FL (ref 79–97)
PLATELET # BLD AUTO: 353 X10E3/UL (ref 150–379)
POTASSIUM SERPL-SCNC: 3.7 MMOL/L (ref 3.5–5.2)
RBC # BLD AUTO: 4.69 X10E6/UL (ref 3.77–5.28)
SODIUM SERPL-SCNC: 135 MMOL/L (ref 134–144)
TRIGL SERPL-MCNC: 129 MG/DL (ref 0–149)
VLDLC SERPL CALC-MCNC: 26 MG/DL (ref 5–40)
WBC # BLD AUTO: 13 X10E3/UL (ref 3.4–10.8)

## 2018-12-17 NOTE — PROGRESS NOTES
Labs good  A1c remains at goal  ASCVD risk 4.5%    Please call and notify pt of results:  - Your blood counts are good  - Your A1c (diabetes average over 3 months) is at the goal of < 7%  - Kidney function and electrolytes are good

## 2019-02-14 DIAGNOSIS — I10 ESSENTIAL HYPERTENSION: ICD-10-CM

## 2019-02-14 RX ORDER — HYDROCHLOROTHIAZIDE 12.5 MG/1
CAPSULE ORAL
Qty: 90 CAP | Refills: 0 | Status: SHIPPED | OUTPATIENT
Start: 2019-02-14 | End: 2019-05-20 | Stop reason: SDUPTHER

## 2019-02-14 NOTE — TELEPHONE ENCOUNTER
Requested Prescriptions     Pending Prescriptions Disp Refills    hydroCHLOROthiazide (MICROZIDE) 12.5 mg capsule 90 Cap 0     Sig: TAKE 1 CAPSULE BY MOUTH EVERY DAY     Patient ran out on 2/12/19.

## 2019-06-20 ENCOUNTER — OFFICE VISIT (OUTPATIENT)
Dept: FAMILY MEDICINE CLINIC | Age: 44
End: 2019-06-20

## 2019-06-20 VITALS
BODY MASS INDEX: 35.92 KG/M2 | HEART RATE: 63 BPM | HEIGHT: 65 IN | WEIGHT: 215.6 LBS | TEMPERATURE: 98.6 F | RESPIRATION RATE: 17 BRPM | SYSTOLIC BLOOD PRESSURE: 127 MMHG | DIASTOLIC BLOOD PRESSURE: 89 MMHG | OXYGEN SATURATION: 100 %

## 2019-06-20 DIAGNOSIS — I10 ESSENTIAL HYPERTENSION: ICD-10-CM

## 2019-06-20 DIAGNOSIS — E11.9 TYPE 2 DIABETES MELLITUS WITHOUT COMPLICATION, WITHOUT LONG-TERM CURRENT USE OF INSULIN (HCC): Primary | ICD-10-CM

## 2019-06-20 DIAGNOSIS — E66.01 SEVERE OBESITY (HCC): ICD-10-CM

## 2019-06-20 DIAGNOSIS — Z12.39 SCREENING FOR BREAST CANCER: ICD-10-CM

## 2019-06-20 RX ORDER — METFORMIN HYDROCHLORIDE 500 MG/1
TABLET ORAL
Qty: 180 TAB | Refills: 3 | Status: SHIPPED | OUTPATIENT
Start: 2019-06-20 | End: 2020-08-03 | Stop reason: SDUPTHER

## 2019-06-20 NOTE — PATIENT INSTRUCTIONS
Type 2 Diabetes: Care Instructions  Your Care Instructions    Type 2 diabetes is a disease that develops when the body's tissues cannot use insulin properly. Over time, the pancreas cannot make enough insulin. Insulin is a hormone that helps the body's cells use sugar (glucose) for energy. It also helps the body store extra sugar in muscle, fat, and liver cells. Without insulin, the sugar cannot get into the cells to do its work. It stays in the blood instead. This can cause high blood sugar levels. A person has diabetes when the blood sugar stays too high too much of the time. Over time, diabetes can lead to diseases of the heart, blood vessels, nerves, kidneys, and eyes. You may be able to control your blood sugar by losing weight, eating a healthy diet, and getting daily exercise. You may also have to take insulin or other diabetes medicine. Follow-up care is a key part of your treatment and safety. Be sure to make and go to all appointments. Call your doctor if you are having problems. It's also a good idea to know your test results and keep a list of the medicines you take. How can you care for yourself at home? · Keep your blood sugar at a target level (which you set with your doctor). ? Eat a good diet that spreads carbohydrate throughout the day. Carbohydrate--the body's main source of fuel--affects blood sugar more than any other nutrient. Carbohydrate is in fruits, vegetables, milk, and yogurt. It also is in breads, cereals, vegetables such as potatoes and corn, and sugary foods such as candy and cakes. ? Aim for 30 minutes of exercise on most, preferably all, days of the week. Walking is a good choice. You also may want to do other activities, such as running, swimming, cycling, or playing tennis or team sports. If your doctor says it's okay, do muscle-strengthening exercises at least 2 times a week. ? Take your medicines exactly as prescribed.  Call your doctor if you think you are having a problem with your medicine. You will get more details on the specific medicines your doctor prescribes. · Check your blood sugar as often as your doctor recommends. It is important to keep track of any symptoms you have, such as low blood sugar. Also tell your doctor if you have any changes in your activities, diet, or insulin use. · Talk to your doctor before you start taking aspirin every day. Aspirin can help certain people lower their risk of a heart attack or stroke. But taking aspirin isn't right for everyone, because it can cause serious bleeding. · Do not smoke. If you need help quitting, talk to your doctor about stop-smoking programs and medicines. These can increase your chances of quitting for good. · Keep your cholesterol and blood pressure at normal levels. You may need to take one or more medicines to reach your goals. Take them exactly as directed. Do not stop or change a medicine without talking to your doctor first.  When should you call for help? Call 911 anytime you think you may need emergency care. For example, call if:    · You passed out (lost consciousness), or you suddenly become very sleepy or confused. (You may have very low blood sugar.)    Call your doctor now or seek immediate medical care if:    · Your blood sugar is 300 mg/dL or is higher than the level your doctor has set for you.     · You have symptoms of low blood sugar, such as:  ? Sweating. ? Feeling nervous, shaky, and weak. ? Extreme hunger and slight nausea. ? Dizziness and headache.  ? Blurred vision. ? Confusion.    Watch closely for changes in your health, and be sure to contact your doctor if:    · You often have problems controlling your blood sugar.     · You have symptoms of long-term diabetes problems, such as:  ? New vision changes. ? New pain, numbness, or tingling in your hands or feet. ? Skin problems. Where can you learn more? Go to http://shayla-michael.info/.   Enter C553 in the search box to learn more about \"Type 2 Diabetes: Care Instructions. \"  Current as of: July 25, 2018  Content Version: 11.9  © 8352-3570 [employer]. Care instructions adapted under license by [employer] (which disclaims liability or warranty for this information). If you have questions about a medical condition or this instruction, always ask your healthcare professional. [address] any warranty or liability for your use of this information. Learning About Metformin for Type 2 Diabetes  Introduction    Metformin (such as Glucophage) is a medicine used to treat type 2 diabetes. It helps keep blood sugar levels on target. You may have tried to eat a healthy diet, lose weight, and get more exercise to keep your blood sugar in your target range. If those things do not help, you may take a medicine called metformin. It helps your body use insulin. This can help you control your blood sugar. You might take it on its own or with other medicines. When taken on its own, metformin should not cause low blood sugar or weight gain. Example  · Metformin (Glucophage)  Possible side effects  Common side effects include:  · Short-term nausea. · Not feeling hungry. · Diarrhea. · Increased gas in your belly. · A metallic taste. You may have side effects or reactions not listed here. Check the information that comes with your medicine. What to know about taking this medicine  · Metformin does not usually cause low blood sugar. But you may get a low blood sugar when you take metformin and you exercise hard, drink alcohol, or you do not eat enough food. · Sometimes metformin is combined with other diabetes medicine. Some of these can cause low blood sugar. · If you need a test that uses a dye or you need to have surgery, be sure to tell all of your doctors that you take metformin. You may have to stop taking it before and after the test or surgery.   · Over time, blood levels of vitamin B12 can decrease in some people who take metformin. Your body needs this B vitamin to make blood cells. It also keeps your nervous system healthy. If you have been taking metformin for more than a few years, ask your doctor if you need a B12 blood test to measure the amount of vitamin B12 in your blood. · Be safe with medicines. Take your medicines exactly as prescribed. Call your doctor if you think you are having a problem with your medicine. · Check with your doctor or pharmacist before you use any other medicines. This includes over-the-counter medicines. Make sure your doctor knows all of the medicines, vitamins, herbal products, and supplements you take. Taking some medicines together can cause problems. Where can you learn more? Go to http://shayla-michael.info/. Enter Bandar Chan in the search box to learn more about \"Learning About Metformin for Type 2 Diabetes. \"  Current as of: July 25, 2018  Content Version: 11.9  © 0161-5081 [employer]. Care instructions adapted under license by [employer] (which disclaims liability or warranty for this information). If you have questions about a medical condition or this instruction, always ask your healthcare professional. [address] any warranty or liability for your use of this information. Learning About Obesity  What is obesity? Obesity means having so much body fat that your health is in danger. Having too much body fat can lead to type 2 diabetes, heart disease, high blood pressure, arthritis, sleep apnea, and stroke. Even if you don't feel bad now, think about these health risks. Do they seem like a good reason to start on a new path toward a healthier weight? Or do you have another personal, powerful reason for wanting to lose weight? Whatever it is, keep it in mind. It can be hard to change eating habits and exercise habits.  But with your own reason and plan, you can do it.  How do you know if your weight is in the obesity range? To know if your weight is in the obesity range, your doctor looks at your body mass index (BMI) and waist size. Your BMI is a number that is calculated from your weight and your height. To figure your BMI for yourself, get a BMI table from your doctor or use an online tool, such as http://www.aWhere.[employer]/ on the [employer] of [employer]. A healthy BMI is from 18.5 to 24.9. If your BMI is from 30.0 to 39.9, you are considered to have obesity. If your BMI is over 40.0, you are considered to have extreme obesity. What causes obesity? When you take in more calories than you burn off, you gain weight. How you eat, how active you are, and other things affect how your body uses calories and whether you gain weight. If you have family members who have too much body fat, you may have inherited a tendency to gain weight. And your family also helps form your eating and lifestyle habits, which can lead to obesity. Also, our busy lives make it harder to plan and cook healthy meals. For many of us, it's easier to reach for prepared foods, go out to eat, or go to the drive-through. But these foods are often high in saturated fat and calories. Portions are often too large. What can you do to reach a healthy weight? Focus on health, not diets. Diets are hard to stay on and don't work in the long run. It is very hard to stay with a diet that includes lots of big changes in your eating habits. Instead of a diet, focus on lifestyle changes that will improve your health and achieve the right balance of energy and calories. To lose weight, you need to burn more calories than you take in. You can do it by eating healthy foods in reasonable amounts and becoming more active, even a little bit every day. Making small changes over time can add up to a lot. Make a plan for change.  Many people have found that naming their reasons for change and staying focused on their plan can make a big difference. Work with your doctor to create a plan that is right for you. · Ask yourself: Nilsa Lamb are my personal, most powerful reasons for wanting this change? What will my life look like when I've made the change? \"  · Set your long-term goal. Make it specific, such as \"I will lose x pounds. \"  · Break your long-term goal into smaller, short-term goals. Make these small steps specific and within your reach, things you know you can do. These steps are what keep you going from day to day. Talk with your doctor about other weight-loss options. If you have a BMI in a certain range and have not been able to lose weight with diet and exercise, medicine or surgery may be an option for you. Before your doctor will prescribe medicines or surgery, he or she will probably want you to be more active and follow your healthy eating plan for a period of time. These habits are key lifelong changes for managing your weight, with or without other medical treatment. And these changes can help you avoid weight-related health problems. How can you stay on your plan for change? Be ready. Choose to start during a time when there are few events that might trigger slip-ups, like holidays, social events, and high-stress periods. Decide on your first few steps. Most people have more success when they make small changes, one step at a time. For example, you might switch a daily candy bar to a piece of fruit, walk 10 minutes more, or add more vegetables to a meal.  Line up your support people. Make sure you're not going to be alone as you make this change. Connect with people who understand how important it is to you. Ask family members and friends for help in keeping with your plan. And think about who could make it harder for you, and how to handle them. Try tracking. People who keep track of what they eat, feel, and do are better at losing weight.  Try writing down things like:  · What and how much you eat. · How you feel before and after each meal.  · Details about each meal (like eating out or at home, eating alone, or with friends or family). · What you do to be active. Look and plan. As you track, look for patterns that you may want to change. Take note of:  · When you eat and whether you skip meals. · How often you eat out. · How many fruits and vegetables you eat. · When you eat beyond feeling full. · When and why you eat for reasons other than being hungry. When you stray from your plan, don't get upset. Figure out what made you slip up and how you can fix it. Can you take medicines or have surgery to lose weight? If you have a BMI in a certain range and have not been able to lose weight with diet and exercise, medicine or surgery may be an option for you. If you have a BMI of at least 30.0 (or a BMI of at least 27.0 and another health problem related to your weight), ask your doctor about weight-loss medicines. They work by making you feel less hungry, making you feel full more quickly, or changing how you digest fat. Medicines are used along with diet changes and more physical activity to help you make lasting changes. If you have a BMI of 40.0 or more (or a BMI of 35.0 or more and another health problem related to your weight), your doctor may talk with you about surgery. Weight-loss surgery has risks, and you will need to work with your doctor to compare the risk of having obesity with the risks of surgery. With any option you choose, you will still need to eat a healthy diet and get regular exercise. Follow-up care is a key part of your treatment and safety. Be sure to make and go to all appointments, and call your doctor if you are having problems. It's also a good idea to know your test results and keep a list of the medicines you take. Where can you learn more? Go to http://shayla-michael.info/.   Enter N111 in the search box to learn more about \"Learning About Obesity. \"  Current as of: June 25, 2018  Content Version: 11.9  © 5090-4819 [employer]. Care instructions adapted under license by [employer] (which disclaims liability or warranty for this information). If you have questions about a medical condition or this instruction, always ask your healthcare professional. [address] any warranty or liability for your use of this information.

## 2019-06-20 NOTE — PROGRESS NOTES
HPI       Harini Grigsby is a 45 y.o. female who presents for complete physical exam  Patient has HTN and T2DM  HTN: takes BP at home. Well controlled with HCTZ 12.5mg. Denies CP, SOB, palpitations  T2DM: last A1c 6.7 in December. She is supposed to be taking 500 mg BID but only is taking one tab. Due for ophthalmologist appooitment in October   Diet and exercise: is not over eating. Avoiding sodas, sweets, cake. Eating vegetables and fruits. Drinking a lot of water. Not exercising       PMHx-reviewed:  Past Medical History:   Diagnosis Date    Allergic rhinitis     Diabetes mellitus ([address]     HTN (hypertension)     Diagnosed in ER in 1/2016 - on HCTZ - checking ambulatory BPs     Meds-reviewed:   Current Outpatient Medications   Medication Sig Dispense Refill    metFORMIN (GLUCOPHAGE) 500 mg tablet TAKE 1 TABLET BY MOUTH TWICE DAILY WITH MEALS 180 Tab 3    hydroCHLOROthiazide (MICROZIDE) 12.5 mg capsule TAKE 1 CAPSULE BY MOUTH EVERY DAY 90 Cap 1    glucose blood VI test strips (BLOOD GLUCOSE TEST) strip Check blood sugar before breakfast and 2 hours after lunch (heaviest meal of day) 50 Strip 11    Blood-Glucose Meter (CONTOUR NEXT EZ METER) monitoring kit check 1 Kit 0    Lancets misc Use as directed checking glucose  Each 4    Blood-Glucose Meter monitoring kit Check blood sugar before breakfast and 2 hours after lunch (heaviest meal of day)  Reli on prefared 1 Kit 0     Allergies-reviewed: Allergies   Allergen Reactions    Peanut Itching       Smoker-reviewed:  Social History     Tobacco Use   Smoking Status Never Smoker   Smokeless Tobacco Never Used     ETOH-reviewed:   Social History     Substance and Sexual Activity   Alcohol Use No     FH-reviewed:   Family History   Problem Relation Age of Onset    Hypertension Mother     Diabetes Father     Hypertension Maternal Grandmother     Diabetes Brother      ROS:  Review of Systems   Constitutional: Negative. Respiratory: Negative. Cardiovascular: Negative. Gastrointestinal: Negative. Endocrine: Negative. Genitourinary: Negative. Skin: Negative. Physical Exam:  Visit Vitals  /89   Pulse 63   Temp 98.6 °F (37 °C) (Oral)   Resp 17   Ht 5' 5\" (1.651 m)   Wt 215 lb 9.6 oz (97.8 kg)   LMP 06/14/2019   SpO2 100%   BMI 35.88 kg/m²       Wt Readings from Last 3 Encounters:   06/20/19 215 lb 9.6 oz (97.8 kg)   12/10/18 219 lb (99.3 kg)   09/28/18 214 lb (97.1 kg)     BP Readings from Last 3 Encounters:   06/20/19 127/89   12/10/18 129/80   09/28/18 118/80      Physical Exam   Constitutional: She appears well-developed and well-nourished. No distress. HENT:   Right Ear: External ear normal.   Left Ear: External ear normal.   Mouth/Throat: Oropharynx is clear and moist. No oropharyngeal exudate. Neck: Normal range of motion. Cardiovascular: Normal rate and regular rhythm. No murmur heard. Pulmonary/Chest: Effort normal and breath sounds normal. She has no wheezes. Abdominal: Soft. Bowel sounds are normal. There is no tenderness. Musculoskeletal: Normal range of motion. She exhibits no edema. Right foot: There is normal range of motion and no deformity. Left foot: There is normal range of motion and no deformity. Feet:   Right Foot:   Protective Sensation: 10 sites tested. 10 sites sensed. Skin Integrity: Negative for ulcer, erythema, callus or dry skin. Left Foot:   Protective Sensation: 10 sites tested. 10 sites sensed. Skin Integrity: Negative for ulcer, erythema, callus or dry skin. Lymphadenopathy:     She has no cervical adenopathy. Skin: Skin is warm and dry. No rash noted. Assessment     45 y.o. female with:    ICD-10-CM ICD-9-CM    1. Type 2 diabetes mellitus without complication, without long-term current use of insulin ([facility]) E11.9 250.00 HEMOGLOBIN A1C WITH EAG      HM DIABETES FOOT EXAM      metFORMIN (GLUCOPHAGE) 500 mg tablet   2.  Severe obesity ([facility]) E66.01 278.01 3. Essential hypertension I10 401.9    4. Screening for breast cancer Z12.31 V76.10 ISMAEL MAMMO BI SCREENING INCL CAD              Plan       Orders Placed This Encounter    ISMAEL MAMMO BI SCREENING INCL CAD    HEMOGLOBIN A1C WITH EAG    HM DIABETES FOOT EXAM    metFORMIN (GLUCOPHAGE) 500 mg tablet     - Will check A1c today. Foot exam done. Talked to patient about compliance and the importance of taking her medicine as prescribed. Refilled metformin   - Mammogram ordered today    Patient discussed with Dr. Sweet Headings     I have discussed the diagnosis with the patient and the intended plan as seen in the above orders. The patient has received an after-visit summary and questions were answered concerning future plans. I have discussed medication side effects and warnings with the patient as well.     Madhu Ferreira MD  Family Medicine Resident

## 2019-06-20 NOTE — PROGRESS NOTES
Chief Complaint   Patient presents with    Complete Physical     Blood pressure 127/89, pulse 63, temperature 98.6 °F (37 °C), temperature source Oral, resp. rate 17, height 5' 5\" (1.651 m), weight 215 lb 9.6 oz (97.8 kg), last menstrual period 06/14/2019, SpO2 100 %. 1. Have you been to the ER, urgent care clinic since your last visit? Hospitalized since your last visit? No    2. Have you seen or consulted any other health care providers outside of the [address] [place] [address] since your last visit? Include any pap smears or colon screening.  No

## 2019-06-21 LAB
EST. AVERAGE GLUCOSE BLD GHB EST-MCNC: 146 MG/DL
HBA1C MFR BLD: 6.7 % (ref 4.8–5.6)

## 2019-11-09 ENCOUNTER — OFFICE VISIT (OUTPATIENT)
Dept: FAMILY MEDICINE CLINIC | Age: 44
End: 2019-11-09

## 2019-11-09 VITALS
WEIGHT: 215 LBS | BODY MASS INDEX: 35.82 KG/M2 | RESPIRATION RATE: 16 BRPM | TEMPERATURE: 96.3 F | HEART RATE: 75 BPM | OXYGEN SATURATION: 100 % | DIASTOLIC BLOOD PRESSURE: 80 MMHG | SYSTOLIC BLOOD PRESSURE: 141 MMHG | HEIGHT: 65 IN

## 2019-11-09 DIAGNOSIS — J06.9 VIRAL URI WITH COUGH: Primary | ICD-10-CM

## 2019-11-09 DIAGNOSIS — J02.9 SORE THROAT: ICD-10-CM

## 2019-11-09 DIAGNOSIS — R19.7 DIARRHEA, UNSPECIFIED TYPE: ICD-10-CM

## 2019-11-09 LAB
FLUAV+FLUBV AG NOSE QL IA.RAPID: NEGATIVE POS/NEG
FLUAV+FLUBV AG NOSE QL IA.RAPID: NEGATIVE POS/NEG
S PYO AG THROAT QL: NEGATIVE
VALID INTERNAL CONTROL?: YES
VALID INTERNAL CONTROL?: YES

## 2019-11-09 RX ORDER — FLUTICASONE PROPIONATE 50 MCG
2 SPRAY, SUSPENSION (ML) NASAL DAILY
Qty: 1 BOTTLE | Refills: 0 | Status: SHIPPED | OUTPATIENT
Start: 2019-11-09 | End: 2020-01-02 | Stop reason: SDUPTHER

## 2019-11-09 NOTE — PROGRESS NOTES
Chief Complaint   Patient presents with    Nasal Congestion     times 3 days    Cough    Diarrhea     1. Have you been to the ER, urgent care clinic since your last visit? Hospitalized since your last visit? No    2. Have you seen or consulted any other health care providers outside of the [address] [place] [address] since your last visit? Include any pap smears or colon screening.  No

## 2019-11-09 NOTE — PATIENT INSTRUCTIONS
Viral Respiratory Infection: Care Instructions  Your Care Instructions    Viruses are very small organisms. They grow in number after they enter your body. There are many types that cause different illnesses, such as colds and the mumps. The symptoms of a viral respiratory infection often start quickly. They include a fever, sore throat, and runny nose. You may also just not feel well. Or you may not want to eat much. Most viral respiratory infections are not serious. They usually get better with time and self-care. Antibiotics are not used to treat a viral infection. That's because antibiotics will not help cure a viral illness. In some cases, antiviral medicine can help your body fight a serious viral infection. Follow-up care is a key part of your treatment and safety. Be sure to make and go to all appointments, and call your doctor if you are having problems. It's also a good idea to know your test results and keep a list of the medicines you take. How can you care for yourself at home? · Rest as much as possible until you feel better. · Be safe with medicines. Take your medicine exactly as prescribed. Call your doctor if you think you are having a problem with your medicine. You will get more details on the specific medicine your doctor prescribes. · Take an over-the-counter pain medicine, such as acetaminophen (Tylenol), ibuprofen (Advil, Motrin), or naproxen (Aleve), as needed for pain and fever. Read and follow all instructions on the label. Do not give aspirin to anyone younger than 20. It has been linked to Reye syndrome, a serious illness. · Drink plenty of fluids, enough so that your urine is light yellow or clear like water. Hot fluids, such as tea or soup, may help relieve congestion in your nose and throat. If you have kidney, heart, or liver disease and have to limit fluids, talk with your doctor before you increase the amount of fluids you drink.   · Try to clear mucus from your lungs by breathing deeply and coughing. · Gargle with warm salt water once an hour. This can help reduce swelling and throat pain. Use 1 teaspoon of salt mixed in 1 cup of warm water. · Do not smoke or allow others to smoke around you. If you need help quitting, talk to your doctor about stop-smoking programs and medicines. These can increase your chances of quitting for good. To avoid spreading the virus  · Cough or sneeze into a tissue. Then throw the tissue away. · If you don't have a tissue, use your hand to cover your cough or sneeze. Then clean your hand. You can also cough into your sleeve. · Wash your hands often. Use soap and warm water. Wash for 15 to 20 seconds each time. · If you don't have soap and water near you, you can clean your hands with alcohol wipes or gel. When should you call for help? Call your doctor now or seek immediate medical care if:    · You have a new or higher fever.     · Your fever lasts more than 48 hours.     · You have trouble breathing.     · You have a fever with a stiff neck or a severe headache.     · You are sensitive to light.     · You feel very sleepy or confused.    Watch closely for changes in your health, and be sure to contact your doctor if:    · You do not get better as expected. Where can you learn more? Go to http://shayla-michael.info/. Enter N375 in the search box to learn more about \"Viral Respiratory Infection: Care Instructions. \"  Current as of: June 9, 2019  Content Version: 12.2  © 4250-1942 [employer]. Care instructions adapted under license by [employer] (which disclaims liability or warranty for this information). If you have questions about a medical condition or this instruction, always ask your healthcare professional. [place] [address] any warranty or liability for your use of this information.          Upper Respiratory Infection (Cold): Care Instructions  Your Care Instructions    An upper respiratory infection, or URI, is an infection of the nose, sinuses, or throat. URIs are spread by coughs, sneezes, and direct contact. The common cold is the most frequent kind of URI. The flu and sinus infections are other kinds of URIs. Almost all URIs are caused by viruses. Antibiotics won't cure them. But you can treat most infections with home care. This may include drinking lots of fluids and taking over-the-counter pain medicine. You will probably feel better in 4 to 10 days. The doctor has checked you carefully, but problems can develop later. If you notice any problems or new symptoms, get medical treatment right away. Follow-up care is a key part of your treatment and safety. Be sure to make and go to all appointments, and call your doctor if you are having problems. It's also a good idea to know your test results and keep a list of the medicines you take. How can you care for yourself at home? · To prevent dehydration, drink plenty of fluids, enough so that your urine is light yellow or clear like water. Choose water and other caffeine-free clear liquids until you feel better. If you have kidney, heart, or liver disease and have to limit fluids, talk with your doctor before you increase the amount of fluids you drink. · Take an over-the-counter pain medicine, such as acetaminophen (Tylenol), ibuprofen (Advil, Motrin), or naproxen (Aleve). Read and follow all instructions on the label. · Before you use cough and cold medicines, check the label. These medicines may not be safe for young children or for people with certain health problems. · Be careful when taking over-the-counter cold or flu medicines and Tylenol at the same time. Many of these medicines have acetaminophen, which is Tylenol. Read the labels to make sure that you are not taking more than the recommended dose. Too much acetaminophen (Tylenol) can be harmful.   · Get plenty of rest.  · Do not smoke or allow others to smoke around you. If you need help quitting, talk to your doctor about stop-smoking programs and medicines. These can increase your chances of quitting for good. When should you call for help? Call 911 anytime you think you may need emergency care. For example, call if:    · You have severe trouble breathing.    Call your doctor now or seek immediate medical care if:    · You seem to be getting much sicker.     · You have new or worse trouble breathing.     · You have a new or higher fever.     · You have a new rash.    Watch closely for changes in your health, and be sure to contact your doctor if:    · You have a new symptom, such as a sore throat, an earache, or sinus pain.     · You cough more deeply or more often, especially if you notice more mucus or a change in the color of your mucus.     · You do not get better as expected. Where can you learn more? Go to http://shayla-michael.info/. Enter K810 in the search box to learn more about \"Upper Respiratory Infection (Cold): Care Instructions. \"  Current as of: June 9, 2019  Content Version: 12.2  © 0411-0634 [employer]. Care instructions adapted under license by [employer] (which disclaims liability or warranty for this information). If you have questions about a medical condition or this instruction, always ask your healthcare professional. [address] any warranty or liability for your use of this information.

## 2019-11-09 NOTE — PROGRESS NOTES
Catarino Bassett is a 45 y.o. female    Issues discussed today include:    Chief Complaint   Patient presents with    Nasal Congestion     times 3 days    Cough    Diarrhea     1) Cough, head congestion:  Started 3 days ago with head congestion and cough. Associated fatigue and several episodes of diarrhea yesterday. + post nasal drainage, clearing her throat causes cough. + sore throat. No known sick contacts, but daughter mentioned sore throat this am. Has tried halls cough drops, hot tea with minimal relief. Has preDM and HTN,  taking HCTZ and metformin on daily basis. Data reviewed or ordered today:       Other problems include:  Patient Active Problem List   Diagnosis Code    Allergic rhinitis J30.9    Hypertension I10    Hyperglycemia R73.9    Obesity E66.9    Severe obesity ([facility] E66.01    GERD (gastroesophageal reflux disease) K21.9       Medications:  Current Outpatient Medications   Medication Sig Dispense Refill    fluticasone propionate (FLONASE) 50 mcg/actuation nasal spray 2 Sprays by Both Nostrils route daily. 1 Bottle 0    guaiFENesin 200 mg/5 mL liqd Take 10mL every 4-6 hours as needed for congestion 473 mL 0    metFORMIN (GLUCOPHAGE) 500 mg tablet TAKE 1 TABLET BY MOUTH TWICE DAILY WITH MEALS 180 Tab 3    hydroCHLOROthiazide (MICROZIDE) 12.5 mg capsule TAKE 1 CAPSULE BY MOUTH EVERY DAY 90 Cap 1    glucose blood VI test strips (BLOOD GLUCOSE TEST) strip Check blood sugar before breakfast and 2 hours after lunch (heaviest meal of day) 50 Strip 11    Blood-Glucose Meter (CONTOUR NEXT EZ METER) monitoring kit check 1 Kit 0    Lancets misc Use as directed checking glucose  Each 4    Blood-Glucose Meter monitoring kit Check blood sugar before breakfast and 2 hours after lunch (heaviest meal of day)  Reli on prefared 1 Kit 0       Allergies: Allergies   Allergen Reactions    Peanut Itching       LMP:  No LMP recorded.     Social History     Socioeconomic History    Marital status: SINGLE     Spouse name: Not on file    Number of children: Not on file    Years of education: Not on file    Highest education level: Not on file   Occupational History    Not on file   Social Needs    Financial resource strain: Not on file    Food insecurity:     Worry: Not on file     Inability: Not on file    Transportation needs:     Medical: Not on file     Non-medical: Not on file   Tobacco Use    Smoking status: Never Smoker    Smokeless tobacco: Never Used   Substance and Sexual Activity    Alcohol use: No    Drug use: No    Sexual activity: Not Currently     Partners: Male   Lifestyle    Physical activity:     Days per week: Not on file     Minutes per session: Not on file    Stress: Not on file   Relationships    Social connections:     Talks on phone: Not on file     Gets together: Not on file     Attends Jehovah's witness service: Not on file     Active member of club or organization: Not on file     Attends meetings of clubs or organizations: Not on file     Relationship status: Not on file    Intimate partner violence:     Fear of current or ex partner: Not on file     Emotionally abused: Not on file     Physically abused: Not on file     Forced sexual activity: Not on file   Other Topics Concern    Not on file   Social History Narrative    Not on file       Family History   Problem Relation Age of Onset    Hypertension Mother     Diabetes Father     Hypertension Maternal Grandmother     Diabetes Brother          Physical Exam   Visit Vitals  /80   Pulse 75   Temp 96.3 °F (35.7 °C) (Oral)   Resp 16   Ht 5' 5\" (1.651 m)   Wt 215 lb (97.5 kg)   SpO2 100%   BMI 35.78 kg/m²      BP Readings from Last 3 Encounters:   11/09/19 141/80   06/20/19 127/89   12/10/18 129/80     Constitutional: Appears well,  No acute distress, Vitals noted  Psychiatric:  Affect normal, Alert and Oriented to person/place/time  Eyes:  Conjunctiva clear, no drainage  ENT:  External ears and nose normal, Mucous membranes moist. TMs grey and non-bulging bilaterally. OP without erythema, edema or exudate. Bilateral nares without active drainage, edema or polyps. Neck:  General inspection normal. Supple. No tender lymphadenopathy. Heart:  Normal HR, Normal S1 and S2,  Regular rhythm. No murmurs, rubs or gallops. Lungs:  Clear to auscultation, good respiratory effort, no wheezes, rales or rhonchi  Skin:  Warm to palpation, without rashes      Assessment/Plan:      ICD-10-CM ICD-9-CM    1. Viral URI with cough J06.9 465.9 fluticasone propionate (FLONASE) 50 mcg/actuation nasal spray    B97.89  guaiFENesin 200 mg/5 mL liqd   2. Sore throat J02.9 462 AMB POC RAPID STREP A   3. Diarrhea, unspecified type R19.7 787.91 AMB POC ABRIL INFLUENZA A/B TEST     Rapid flu and strep neg  Symptomatic tx with flonase and mucinex  Can continue hot tea, lozenges prn  Tylenol or motrin for fever, pain prn  Stay well hydrated      Follow-up and Dispositions    · Return if symptoms worsen or fail to improve in 5-7 days.        En Tyson MD

## 2019-11-15 ENCOUNTER — OFFICE VISIT (OUTPATIENT)
Dept: FAMILY MEDICINE CLINIC | Age: 44
End: 2019-11-15

## 2019-11-15 VITALS
TEMPERATURE: 98.5 F | BODY MASS INDEX: 35.65 KG/M2 | DIASTOLIC BLOOD PRESSURE: 76 MMHG | WEIGHT: 214 LBS | HEART RATE: 73 BPM | SYSTOLIC BLOOD PRESSURE: 126 MMHG | HEIGHT: 65 IN | RESPIRATION RATE: 16 BRPM | OXYGEN SATURATION: 100 %

## 2019-11-15 DIAGNOSIS — S76.311A STRAIN OF RIGHT HAMSTRING, INITIAL ENCOUNTER: Primary | ICD-10-CM

## 2019-11-15 NOTE — PROGRESS NOTES
[address] [facility] [address] [place] [address]   Office [phone], Fax [phone]    Subjective:     Chief Complaint   Patient presents with    Leg Pain     right hamstring times 3 days     History provided by patient     HPI:  Ash Wilson is a 45 y.o. BLACK OR  female presents for R hamstring pain. No significant history pertinent to presentation. R hamstring pain  - 3 days of hamstring nagging pain throughout the day. Took the kids on jumping but did not jump but had to get them on it. Not sure if she had pulled anything. Denies trauma, fever/chills, urine/bowel control, unintentional wt loss, sensation difference, weakness. Have not tried anything otc. Medication reviewed. Allergy reviewed. SocHx. - Denies smoking, alcohol use, illcit drug use. ROS (bolded are positive):   General Negative for fever, chills, changes in weight, changes in appetite   CV Negative for chest pain, palpitations, edema   Respiratory Negative for cough, shortness of breath, wheezing   MSK Negative for back pain, joint pain, muscle pain   Neuro Negative for dizziness, headache, confusion, weakness   Psych Negative for anxiety, depression, change in mood     Objective:   Vitals - reviewed  Visit Vitals  /76   Pulse 73   Temp 98.5 °F (36.9 °C) (Oral)   Resp 16   Ht 5' 5\" (1.651 m)   Wt 214 lb (97.1 kg)   SpO2 100%   BMI 35.61 kg/m²      Physical exam:   GEN: NAD. Alert. Well nourished. EYES:  Conjunctiva clear; PERRLA. extraocular movements are intact. LUNGS: Respirations unlabored; CTAB. no wheeze, rales, rhonchi   CARDIOVASCULAR: Regular, rate, and rhythm without murmurs, gallops or rubs   NEUROLOGIC:  No focal neurologic deficits. Strength and sensation grossly intact. EXT: Well perfused. No edema. No erythema. PSYCH: appropriate mood and affect. Good insight and judgement. Cooperative. SKIN: No obvious rashes or lesions.      MSK - Hip right:    Deformity: None    ROM: Flexion: Normal    Extension: Normal     Internal/external rotation: Normal      Gait: Normal       Palpation:    L1-L5: No tenderness    Sacrum: No tenderness    Coccyx: No tenderness    Left Paraspinal: No tenderness    Right Paraspinal: No tenderness    Greater trochanter: No tenderness    Ischial Tuberosity: No tenderness    Piriformis: No tenderness     General ache on hamstrings      Strength (0-5/5)    Hip Flexion:   Left: 5/5  Right: 5/5    Hip Extension:  Left: 5/5  Right: 5/5    Hip Abduction:  Left: 5/5  Right: 5/5    Hip Adduction:  Left: 5/5  Right: 5/5    Knee Extension:  Left: 5/5  Right: 5/5    Knee Flexion:   Left: 5/5  Right: 5/5     Sensation: Intact, no deficits      DTR:    Patella:  Left: +2  Right: +2     Special test:    Straight leg: Left: Negative  Right: Negative    Christianos: Left: Negative  Right: Negative    Piriformis: Left: Negative   Right: Negative      Pertinent Labs/Studies:   - n/a    Assessment and orders:     44 yo F previously healthy here with R hamstring strain. Advised RICE. Given home stretching exercises. Precautions given to return. ICD-10-CM ICD-9-CM    1. Strain of right hamstring, initial encounter S76.311A 843.8      Follow-up and Dispositions    · Return in about 3 weeks (around 12/6/2019), or if symptoms worsen or fail to improve, for sports medicine (Dr Valerie Garcia or Dr Craig Napoles). Pt was discussed with Dr Carolina Candelaria (attending physician). I have reviewed patient medical and social history and medications. I have reviewed pertinent labs results and other data. I have discussed the diagnosis with the patient and the intended plan as seen in the above orders. The patient has received an after-visit summary and questions were answered concerning future plans. I have discussed medication side effects and warnings with the patient as well.     Petey Dobson MD  Resident [facility]  11/17/19

## 2019-11-15 NOTE — PATIENT INSTRUCTIONS
Hamstring Syndrome: Care Instructions  Your Care Instructions    The hamstring muscles are the three muscles in the back of the thigh. The sciatic nerve is a large nerve that runs from the low back down the legs. Hamstring syndrome is a condition caused by pressure on this nerve. The nerve may be pinched between the hamstring muscles and the pelvic bone or by the band of tissue that connects the hamstring muscles. This condition can cause pain in the hip and buttock and sometimes numbness down the back of the leg. It may hurt to sit down or stretch the hamstrings. You may have less pain when you lie on your back. Hamstring syndrome may be the result of wear and tear to the back and hamstrings. It is most often seen in people who play sports that involve running, kicking, or jumping. Other problems can cause leg pain and numbness. To diagnose hamstring syndrome, the doctor will ask about your symptoms and your activities and examine your leg. Hamstring syndrome usually gets better in a few weeks with rest and home care. The doctor may recommend exercises to stretch and strengthen your hip muscles. If home care doesn't help, your doctor may suggest a steroid shot to help reduce pain and swelling. Follow-up care is a key part of your treatment and safety. Be sure to make and go to all appointments, and call your doctor if you are having problems. It's also a good idea to know your test results and keep a list of the medicines you take. How can you care for yourself at home? · Ask your doctor if you can take an over-the-counter pain medicine, such as acetaminophen (Tylenol), ibuprofen (Advil, Motrin), or naproxen (Aleve). Be safe with medicines. Read and follow all instructions on the label. · Put ice or a cold pack on the painful area for 10 to 20 minutes at a time. Try to do this every 1 to 2 hours for the next 3 days (when you are awake) or until the swelling goes down.  Put a thin cloth between the ice and your skin. · After 2 or 3 days, if your swelling is gone, apply heat. Put a warm water bottle, a heating pad set on low, or a warm cloth over the painful area. Do not go to sleep with a heating pad on your skin. · Avoid sitting if possible, unless it feels better than standing. · Alternate lying down with short walks. Increase your walking distance as you are able to walk without making your symptoms worse. · Don't do anything that makes your symptoms worse. Return to your usual level of activity slowly. When should you call for help? Watch closely for changes in your health, and be sure to contact your doctor if:    · You have new or worse pain.     · You have new symptoms.     · You do not get better as expected. Where can you learn more? Go to http://shayla-michael.info/. Enter Q269 in the search box to learn more about \"Hamstring Syndrome: Care Instructions. \"  Current as of: June 26, 2019  Content Version: 12.2  © 3354-5489 [employer]. Care instructions adapted under license by [employer] (which disclaims liability or warranty for this information). If you have questions about a medical condition or this instruction, always ask your healthcare professional. [place] [address] any warranty or liability for your use of this information. Gluteal Strain: Rehab Exercises  Introduction  Here are some examples of exercises for you to try. The exercises may be suggested for a condition or for rehabilitation. Start each exercise slowly. Ease off the exercises if you start to have pain. You will be told when to start these exercises and which ones will work best for you. How to do the exercises  Hip rotator stretch    1. Lie on your back with both knees bent and your feet flat on the floor. 2. Put the ankle of your affected leg on your opposite thigh near your knee.   3. Use your hand to gently push the knee of your affected leg away from your body until you feel a gentle stretch around your hip. 4. Hold the stretch for 15 to 30 seconds. 5. Repeat 2 to 4 times. 6. Repeat steps 1 through 5, but this time use your hand to gently pull your knee toward your opposite shoulder. 7. Switch legs and repeat steps 1 through 6, even if only one hip is sore. Seated hip rotator stretch    1. Sit in a sturdy chair. 2. Cross your affected leg over your knee, resting your foot on top of your knee. 3. Keep your back straight, and slowly lean forward until you feel a stretch in your hip. 4. Hold for 15 to 30 seconds. 5. Switch legs and repeat steps 1 through 4 on your other side. 6. Repeat 2 to 4 times. Hamstring stretch (lying down)    1. Lie flat on your back with your legs straight. If you feel discomfort in your back, place a small towel roll under your lower back. 2. Holding the back of your affected leg, lift your leg straight up and toward your body until you feel a stretch at the back of your thigh. 3. Hold the stretch for at least 30 seconds. 4. Repeat 2 to 4 times. 5. Switch legs and repeat steps 1 through 4, even if only one hip is sore. Bridging    1. Lie on your back with both knees bent. Your knees should be bent about 90 degrees. 2. Then push your feet into the floor, squeeze your buttocks, and lift your hips off the floor until your shoulders, hips, and knees are all in a straight line. 3. Hold for about 6 seconds as you continue to breathe normally, and then slowly lower your hips back down to the floor and rest for up to 10 seconds. 4. Repeat 8 to 12 times. Single-leg bridge    1. Lie on your back, with your arms at your sides. 2. Bend one knee, and keep that foot flat on the floor. The other leg should be straight. 3. Raise the straight leg up so that the knee is level with the bent knee. 4. Tighten your belly muscles by pulling your belly button in toward your spine.  Lift your buttocks up and be careful not to let your hips drop down. 5. Hold for about 6 seconds as you continue to breathe normally, and then slowly lower your hips back down to the floor. 6. Switch legs and repeat steps 1 though 5.  7. Repeat 8 to 12 times. Follow-up care is a key part of your treatment and safety. Be sure to make and go to all appointments, and call your doctor if you are having problems. It's also a good idea to know your test results and keep a list of the medicines you take. Where can you learn more? Go to http://shayla-michael.info/. Enter K201 in the search box to learn more about \"Gluteal Strain: Rehab Exercises. \"  Current as of: June 26, 2019  Content Version: 12.2  © 9172-5228 [employer]. Care instructions adapted under license by [employer] (which disclaims liability or warranty for this information). If you have questions about a medical condition or this instruction, always ask your healthcare professional. [address] any warranty or liability for your use of this information.

## 2019-11-15 NOTE — PROGRESS NOTES
Chief Complaint   Patient presents with    Leg Pain     right hamstring times 3 days     1. Have you been to the ER, urgent care clinic since your last visit? Hospitalized since your last visit? No    2. Have you seen or consulted any other health care providers outside of the [address] [place] [address] since your last visit? Include any pap smears or colon screening.  No

## 2020-01-02 DIAGNOSIS — J06.9 VIRAL URI WITH COUGH: ICD-10-CM

## 2020-01-02 RX ORDER — FLUTICASONE PROPIONATE 50 MCG
2 SPRAY, SUSPENSION (ML) NASAL DAILY
Qty: 1 BOTTLE | Refills: 1 | Status: SHIPPED | OUTPATIENT
Start: 2020-01-02 | End: 2020-04-07 | Stop reason: SDUPTHER

## 2020-01-03 DIAGNOSIS — E11.9 TYPE 2 DIABETES MELLITUS WITHOUT COMPLICATION, WITHOUT LONG-TERM CURRENT USE OF INSULIN (HCC): ICD-10-CM

## 2020-01-03 NOTE — TELEPHONE ENCOUNTER
----- Message from Marlin Sutton sent at 1/3/2020  1:05 PM EST -----  Regarding: Dr. Toro Greene  General Message/Vendor Calls    Caller's first and last name:  pt    Reason for call:  Status of medication refill    Callback required yes/no and why:  yes    Best contact number(s):  [phone]    Details to clarify the request:  Inquiring the status of refill request sent by [facility] pharmacy for medication \"Metformin\" on Tuesday 12/31/2019. Pt stated, she is out of medication now and have been since yesterday. Requesting for refill of medication to sent to pharmacy.     Marlin Sutton

## 2020-01-06 RX ORDER — METFORMIN HYDROCHLORIDE 500 MG/1
TABLET ORAL
Qty: 180 TAB | Refills: 3 | OUTPATIENT
Start: 2020-01-06

## 2020-01-07 NOTE — TELEPHONE ENCOUNTER
Two patient identifiers confirmed (name and ). Spoke with patient regarding request for Metformin 500 mg tablet. Notified patient per Dr. Brown Number Metformin prescription was sent to pharmacy  for a year. Patient verbalized understanding. Patient states she was able to  refill of Metformin at pharmacy.

## 2020-02-13 DIAGNOSIS — I10 ESSENTIAL HYPERTENSION: ICD-10-CM

## 2020-02-14 RX ORDER — HYDROCHLOROTHIAZIDE 12.5 MG/1
CAPSULE ORAL
Qty: 90 CAP | Refills: 0 | Status: SHIPPED | OUTPATIENT
Start: 2020-02-14 | End: 2020-06-04

## 2020-04-07 DIAGNOSIS — J30.2 SEASONAL ALLERGIC RHINITIS: ICD-10-CM

## 2020-04-07 DIAGNOSIS — J06.9 VIRAL URI WITH COUGH: ICD-10-CM

## 2020-04-07 DIAGNOSIS — J30.9 ALLERGIC RHINITIS, UNSPECIFIED SEASONALITY, UNSPECIFIED TRIGGER: Primary | ICD-10-CM

## 2020-04-07 RX ORDER — FLUTICASONE PROPIONATE 50 MCG
2 SPRAY, SUSPENSION (ML) NASAL DAILY
Qty: 1 BOTTLE | Refills: 1 | Status: SHIPPED | OUTPATIENT
Start: 2020-04-07 | End: 2021-04-07 | Stop reason: SDUPTHER

## 2020-04-07 RX ORDER — CETIRIZINE HYDROCHLORIDE 5 MG/1
5 TABLET ORAL DAILY
Qty: 30 TAB | Refills: 2 | Status: SHIPPED | OUTPATIENT
Start: 2020-04-07

## 2020-04-07 NOTE — TELEPHONE ENCOUNTER
Reviewed chart. Approved request for Flonase. Per chart review, pt previously taking Zyrtec 5mg daily; refilled med as well.      Raúl Glasgow MD

## 2020-04-07 NOTE — TELEPHONE ENCOUNTER
----- Message from Juan José Vidal sent at 4/7/2020  1:40 PM EDT -----  Regarding: Dr. Antonio Habermann / Jeanine Bush: [phone]  Caller (if not patient): N/A  Relationship of caller (if not patient): N/A  Best contact number(s): [phone]  Name of medication and dosage if known: \"Fluticasone Propionate\" Nasal Spray, also requesting call in for Allergy pills  Is patient out of this medication (yes/no): Yes  Pharmacy name: [facility] listed in chart? (yes/no): Yes  Pharmacy phone number: N/A  Date of last visit: 11/15/2019  Details to clarify the request: N/A

## 2020-06-02 DIAGNOSIS — I10 ESSENTIAL HYPERTENSION: ICD-10-CM

## 2020-06-04 RX ORDER — HYDROCHLOROTHIAZIDE 12.5 MG/1
CAPSULE ORAL
Qty: 90 CAP | Refills: 0 | Status: SHIPPED | OUTPATIENT
Start: 2020-06-04 | End: 2020-08-03 | Stop reason: SDUPTHER

## 2020-06-04 NOTE — TELEPHONE ENCOUNTER
Refilling medication per request. Pt needs a visit and labs to get refills moving forward. Will forward for scheduling.

## 2020-08-03 ENCOUNTER — VIRTUAL VISIT (OUTPATIENT)
Dept: FAMILY MEDICINE CLINIC | Age: 45
End: 2020-08-03
Payer: MEDICAID

## 2020-08-03 DIAGNOSIS — E11.9 TYPE 2 DIABETES MELLITUS WITHOUT COMPLICATION, WITHOUT LONG-TERM CURRENT USE OF INSULIN (HCC): ICD-10-CM

## 2020-08-03 DIAGNOSIS — I10 ESSENTIAL HYPERTENSION: Primary | ICD-10-CM

## 2020-08-03 PROCEDURE — 99214 OFFICE O/P EST MOD 30 MIN: CPT | Performed by: STUDENT IN AN ORGANIZED HEALTH CARE EDUCATION/TRAINING PROGRAM

## 2020-08-03 RX ORDER — HYDROCHLOROTHIAZIDE 12.5 MG/1
CAPSULE ORAL
Qty: 90 CAP | Refills: 0 | Status: SHIPPED | OUTPATIENT
Start: 2020-08-03 | End: 2020-12-30

## 2020-08-03 RX ORDER — METFORMIN HYDROCHLORIDE 500 MG/1
TABLET ORAL
Qty: 180 TAB | Refills: 3 | Status: SHIPPED | OUTPATIENT
Start: 2020-08-03 | End: 2021-04-07 | Stop reason: SDUPTHER

## 2020-08-03 NOTE — PROGRESS NOTES
Tsering Meek  39 y.o. female  1975  [address] [id number]  [id number]   [address]:    Telemedicine Progress Note  [place] [place]       Encounter Date and Time: August 3, 2020 at 2:48 PM    Consent:  She and/or the health care decision maker is aware that that she may receive a bill for this telephone service, depending on her insurance coverage, and has provided verbal consent to proceed: Yes    Chief Complaint   Patient presents with    Medication Evaluation     History of Present Illness   Tsering Meek is a 39 y.o. female was evaluated by synchronous (real-time) audio-video technology from home, through the [facility] Patient Portal.    DM: Metformin 500 mg BID, saw a eye doctor in 01/2020. Last A1c was 6.7 on 6/2019  HTN: HCTZ 12.5 mg once daily. Does not have a BP machine at home. Diet and exercise: Has been walking daily for the past 3 weeks (5-6 miles or ~ 2 hours). Avoids sodas and juices (might have a juice 2x a month). Eats baked meat and avoids fried food. Avoids pastas and breads.      Review of Systems   Review of Systems   Constitutional: Negative for chills and fever. HENT: Negative for congestion and sore throat. Respiratory: Negative for cough and shortness of breath. Cardiovascular: Negative for chest pain and palpitations. Gastrointestinal: Negative for diarrhea, nausea and vomiting. Psychiatric/Behavioral: Negative for depression and suicidal ideas. History   Patients past medical, surgical and family histories were reviewed and updated. Past Medical History:   Diagnosis Date    Allergic rhinitis     Diabetes mellitus ([facility] [address]     HTN (hypertension)     Diagnosed in ER in 1/2016 - on HCTZ - checking ambulatory BPs     No past surgical history on file.   Family History   Problem Relation Age of Onset    Hypertension Mother     Diabetes Father     Hypertension Maternal Grandmother     Diabetes Brother      Social History     Socioeconomic History    Marital status: SINGLE     Spouse name: Not on file    Number of children: Not on file    Years of education: Not on file    Highest education level: Not on file   Occupational History    Not on file   Social Needs    Financial resource strain: Not on file    Food insecurity     Worry: Not on file     Inability: Not on file    Transportation needs     Medical: Not on file     Non-medical: Not on file   Tobacco Use    Smoking status: Never Smoker    Smokeless tobacco: Never Used   Substance and Sexual Activity    Alcohol use: No    Drug use: No    Sexual activity: Not Currently     Partners: Male   Lifestyle    Physical activity     Days per week: Not on file     Minutes per session: Not on file    Stress: Not on file   Relationships    Social connections     Talks on phone: Not on file     Gets together: Not on file     Attends Hoahaoism service: Not on file     Active member of club or organization: Not on file     Attends meetings of clubs or organizations: Not on file     Relationship status: Not on file    Intimate partner violence     Fear of current or ex partner: Not on file     Emotionally abused: Not on file     Physically abused: Not on file     Forced sexual activity: Not on file   Other Topics Concern    Not on file   Social History Narrative    Not on file     Patient Active Problem List   Diagnosis Code    Allergic rhinitis J30.9    Hypertension I10    Hyperglycemia R73.9    Obesity E66.9    Severe obesity ([facility] [address] E66.01    GERD (gastroesophageal reflux disease) K21.9     Denies any recent contact with possible/positive COVID patients. No SOB, cough or fever.      Vitals/Objective:     General: alert, cooperative, no distress   Mental  status: mental status: alert, oriented to person, place, and time, normal mood, behavior, speech, dress, motor activity, and thought processes   Resp: resp: normal effort and no respiratory distress   Neuro: neuro: no gross deficits   Skin: skin: no discoloration or lesions of concern on visible areas   Due to this being a TeleHealth evaluation, many elements of the physical examination are unable to be assessed. Assessment and Plan:     1. Essential hypertension  - METABOLIC PANEL, BASIC; Future  - hydroCHLOROthiazide (MICROZIDE) 12.5 mg capsule; TAKE 1 CAPSULE BY MOUTH EVERY DAY  Dispense: 90 Cap; Refill: 0  - Advised to start checking BP at home     2. Type 2 diabetes mellitus without complication, without long-term current use of insulin (HCC)  - HEMOGLOBIN A1C WITH EAG; Future  - LIPID PANEL; Future  - AMB POC URINE, MICROALBUMIN, SEMIQUANT (3 RESULTS)  - metFORMIN (GLUCOPHAGE) 500 mg tablet; TAKE 1 TABLET BY MOUTH TWICE DAILY WITH MEALS  Dispense: 180 Tab; Refill: 3    Time spent in direct conversation with the patient to include medical condition(s) discussed, assessment and treatment plan:    We discussed the expected course, resolution and complications of the diagnosis(es) in detail. Medication risks, benefits, costs, interactions, and alternatives were discussed as indicated. I advised her to contact the office if her condition worsens, changes or fails to improve as anticipated. She expressed understanding with the diagnosis(es) and plan. Patient understands that this encounter was a temporary measure, and the importance of further follow up and examination was emphasized. Patient verbalized understanding. Patient informed to follow up: For lab work on 8/7 at 80 AM    Electronically Signed: Gillian Moreland DO    Providers location when delivering service (clinic, hospital, home): Home     CPT Codes 73914-17329 for Established Patients may apply to this Telehealth Visit. POS code: 18.   Modifier GT      Pursuant to the emergency declaration under the [facility], [address] [place] [address] authority and the [employer] Appropriations Act, this Virtual  Visit was conducted, with patient's consent, to reduce the patient's risk of exposure to COVID-19 and provide continuity of care for an established patient. Services were provided through a video synchronous discussion virtually to substitute for in-person clinic visit. Current Medications/Allergies   Medications and Allergies reviewed:    Current Outpatient Medications   Medication Sig Dispense Refill    hydroCHLOROthiazide (MICROZIDE) 12.5 mg capsule TAKE 1 CAPSULE BY MOUTH EVERY DAY 90 Cap 0    fluticasone propionate (FLONASE) 50 mcg/actuation nasal spray 2 Sprays by Both Nostrils route daily. 1 Bottle 1    cetirizine (ZYRTEC) 5 mg tablet Take 1 Tab by mouth daily.  30 Tab 2    guaiFENesin 200 mg/5 mL liqd Take 10mL every 4-6 hours as needed for congestion 473 mL 0    metFORMIN (GLUCOPHAGE) 500 mg tablet TAKE 1 TABLET BY MOUTH TWICE DAILY WITH MEALS 180 Tab 3    glucose blood VI test strips (BLOOD GLUCOSE TEST) strip Check blood sugar before breakfast and 2 hours after lunch (heaviest meal of day) 50 Strip 11    Blood-Glucose Meter (CONTOUR NEXT EZ METER) monitoring kit check 1 Kit 0    Blood-Glucose Meter monitoring kit Check blood sugar before breakfast and 2 hours after lunch (heaviest meal of day)  Reli on prefared 1 Kit 0    Lancets misc Use as directed checking glucose  Each 4     Allergies   Allergen Reactions    Peanut Itching

## 2020-08-03 NOTE — Clinical Note
Can you please schedule patient for a lab only visit on 8/7 at 9:30 am? Please let me know once it has been scheduled. Thank you.

## 2020-08-04 ENCOUNTER — TELEPHONE (OUTPATIENT)
Dept: FAMILY MEDICINE CLINIC | Age: 45
End: 2020-08-04

## 2020-08-04 DIAGNOSIS — E11.9 TYPE 2 DIABETES MELLITUS WITHOUT COMPLICATION, WITHOUT LONG-TERM CURRENT USE OF INSULIN (HCC): Primary | ICD-10-CM

## 2020-08-07 ENCOUNTER — LAB ONLY (OUTPATIENT)
Dept: FAMILY MEDICINE CLINIC | Age: 45
End: 2020-08-07

## 2020-08-07 ENCOUNTER — HOSPITAL ENCOUNTER (OUTPATIENT)
Dept: LAB | Age: 45
Discharge: HOME | End: 2020-08-07

## 2020-08-07 DIAGNOSIS — I10 ESSENTIAL HYPERTENSION: ICD-10-CM

## 2020-08-07 DIAGNOSIS — E11.9 TYPE 2 DIABETES MELLITUS WITHOUT COMPLICATION, WITHOUT LONG-TERM CURRENT USE OF INSULIN (HCC): ICD-10-CM

## 2020-08-07 LAB
ANION GAP SERPL CALC-SCNC: 6 MMOL/L (ref 5–15)
BUN SERPL-MCNC: 8 MG/DL (ref 6–20)
BUN/CREAT SERPL: 13 (ref 12–20)
CALCIUM SERPL-MCNC: 9.3 MG/DL (ref 8.5–10.1)
CHLORIDE SERPL-SCNC: 102 MMOL/L (ref 97–108)
CHOLEST SERPL-MCNC: 203 MG/DL
CO2 SERPL-SCNC: 28 MMOL/L (ref 21–32)
CREAT SERPL-MCNC: 0.61 MG/DL (ref 0.55–1.02)
EST. AVERAGE GLUCOSE BLD GHB EST-MCNC: 166 MG/DL
GLUCOSE SERPL-MCNC: 136 MG/DL (ref 65–100)
HBA1C MFR BLD: 7.4 % (ref 4–5.6)
HDLC SERPL-MCNC: 58 MG/DL
HDLC SERPL: 3.5 {RATIO} (ref 0–5)
LDLC SERPL CALC-MCNC: 126.6 MG/DL (ref 0–100)
LIPID PROFILE,FLP: ABNORMAL
POTASSIUM SERPL-SCNC: 4.3 MMOL/L (ref 3.5–5.1)
SODIUM SERPL-SCNC: 136 MMOL/L (ref 136–145)
TRIGL SERPL-MCNC: 92 MG/DL (ref ?–150)
VLDLC SERPL CALC-MCNC: 18.4 MG/DL

## 2020-08-10 ENCOUNTER — TELEPHONE (OUTPATIENT)
Dept: FAMILY MEDICINE CLINIC | Age: 45
End: 2020-08-10

## 2020-08-10 DIAGNOSIS — E11.9 TYPE 2 DIABETES MELLITUS WITHOUT COMPLICATION, WITHOUT LONG-TERM CURRENT USE OF INSULIN (HCC): Primary | ICD-10-CM

## 2020-08-10 NOTE — TELEPHONE ENCOUNTER
Discussed A1c increased to 7.4 from previous 6.7 to patient and advised that we make lifestyle changes as discussed during our visit along with increasing the Metformin dose but patient would like to hold off on increasing metformin at this time and see if lifestyle changes over the next 3 months are able to help her A1c. Recheck in Nov. Also discussed that patient should be on a mod intensity statin at this time per ADA recommendations. Patient would like to further educate herself regarding statins and will reconsider and make me aware.

## 2020-12-30 DIAGNOSIS — I10 ESSENTIAL HYPERTENSION: ICD-10-CM

## 2020-12-30 RX ORDER — HYDROCHLOROTHIAZIDE 12.5 MG/1
CAPSULE ORAL
Qty: 90 CAP | Refills: 0 | Status: SHIPPED | OUTPATIENT
Start: 2020-12-30 | End: 2021-04-06

## 2021-04-06 DIAGNOSIS — J30.9 ALLERGIC RHINITIS, UNSPECIFIED SEASONALITY, UNSPECIFIED TRIGGER: ICD-10-CM

## 2021-04-06 DIAGNOSIS — I10 ESSENTIAL HYPERTENSION: ICD-10-CM

## 2021-04-06 DIAGNOSIS — E11.9 TYPE 2 DIABETES MELLITUS WITHOUT COMPLICATION, WITHOUT LONG-TERM CURRENT USE OF INSULIN (HCC): ICD-10-CM

## 2021-04-06 RX ORDER — HYDROCHLOROTHIAZIDE 12.5 MG/1
CAPSULE ORAL
Qty: 90 CAP | Refills: 0 | OUTPATIENT
Start: 2021-04-06

## 2021-04-06 RX ORDER — HYDROCHLOROTHIAZIDE 12.5 MG/1
CAPSULE ORAL
Qty: 90 CAP | Refills: 0 | Status: SHIPPED | OUTPATIENT
Start: 2021-04-06 | End: 2021-04-07 | Stop reason: SDUPTHER

## 2021-04-06 RX ORDER — FLUTICASONE PROPIONATE 50 MCG
SPRAY, SUSPENSION (ML) NASAL
Qty: 16 G | OUTPATIENT
Start: 2021-04-06

## 2021-04-07 RX ORDER — FLUTICASONE PROPIONATE 50 MCG
2 SPRAY, SUSPENSION (ML) NASAL DAILY
Qty: 1 BOTTLE | Refills: 1 | Status: SHIPPED | OUTPATIENT
Start: 2021-04-07

## 2021-04-07 RX ORDER — HYDROCHLOROTHIAZIDE 12.5 MG/1
CAPSULE ORAL
Qty: 90 CAP | Refills: 0 | Status: SHIPPED | OUTPATIENT
Start: 2021-04-07 | End: 2021-10-14

## 2021-04-07 RX ORDER — METFORMIN HYDROCHLORIDE 500 MG/1
TABLET ORAL
Qty: 180 TAB | Refills: 3 | Status: SHIPPED | OUTPATIENT
Start: 2021-04-07 | End: 2021-12-27

## 2021-04-14 ENCOUNTER — VIRTUAL VISIT (OUTPATIENT)
Dept: FAMILY MEDICINE CLINIC | Age: 46
End: 2021-04-14
Payer: MEDICAID

## 2021-04-14 DIAGNOSIS — R07.0 THROAT PAIN: Primary | ICD-10-CM

## 2021-04-14 DIAGNOSIS — J30.9 ALLERGIC RHINITIS, UNSPECIFIED SEASONALITY, UNSPECIFIED TRIGGER: ICD-10-CM

## 2021-04-14 PROCEDURE — 99213 OFFICE O/P EST LOW 20 MIN: CPT | Performed by: STUDENT IN AN ORGANIZED HEALTH CARE EDUCATION/TRAINING PROGRAM

## 2021-04-14 RX ORDER — MINERAL OIL
180 ENEMA (ML) RECTAL DAILY
Qty: 30 TAB | Refills: 5 | Status: SHIPPED | OUTPATIENT
Start: 2021-04-14

## 2021-04-14 RX ORDER — FLUTICASONE PROPIONATE 50 MCG
1 SPRAY, SUSPENSION (ML) NASAL DAILY
Qty: 1 BOTTLE | Refills: 5 | Status: SHIPPED | OUTPATIENT
Start: 2021-04-14

## 2021-04-14 RX ORDER — FAMOTIDINE 20 MG/1
20 TABLET, FILM COATED ORAL 2 TIMES DAILY
Qty: 60 TAB | Refills: 1 | Status: SHIPPED | OUTPATIENT
Start: 2021-04-14

## 2021-04-14 NOTE — PROGRESS NOTES
[address] Medicine Residency Attending Addendum:  Dr. Tahir Garg, DO,  the patient and I were not physically present during this encounter. The resident and I are concurrently monitoring the patient care through appropriate telecommunication technology. I discussed the findings, assessment and plan with the resident and agree with the resident's findings and plan as documented in the resident's note.       Jovanni Hull MD

## 2021-04-14 NOTE — PROGRESS NOTES
History of Present Illness:     Consent:  Patient and/or health care decision maker is aware that that she may receive a bill for this telephone service, depending on her insurance coverage, and has provided verbal consent to proceed: Yes    Patient was evaluated by synchronous (real-time) audio-video technology from home, through the [employer] [facility] portal     Chief Complaint   Patient presents with   Hafsa Morton is a 39 y.o. female being seen for acute care:     Pt states that since 4 days ago she started feeling tenderness on the R side of her neck. Pain with swallowing food. Also had some R sided ear pain at the same time but the ear pain has now resolved. Denies any sore throat or coughing. Currently symptoms have slightly improved and are intermittent. Denies eating any sharp foods such as fish with bones. Denies any sick contacts. Does not have any fevers or chills. PMH (REVIEWED):  Past Medical History:   Diagnosis Date    Allergic rhinitis     Diabetes mellitus ([facility] [address]     HTN (hypertension)     Diagnosed in ER in 1/2016 - on HCTZ - checking ambulatory BPs       Current Medications/Allergies (REVIEWED):     Current Outpatient Medications on File Prior to Visit   Medication Sig Dispense Refill    hydroCHLOROthiazide (MICROZIDE) 12.5 mg capsule TAKE 1 CAPSULE BY MOUTH EVERY DAY 90 Cap 0    fluticasone propionate (FLONASE) 50 mcg/actuation nasal spray 2 Sprays by Both Nostrils route daily. 1 Bottle 1    cetirizine (ZYRTEC) 5 mg tablet Take 1 Tab by mouth daily.  30 Tab 2    metFORMIN (GLUCOPHAGE) 500 mg tablet TAKE 1 TABLET BY MOUTH TWICE DAILY WITH MEALS 180 Tab 3    [DISCONTINUED] guaiFENesin 200 mg/5 mL liqd Take 10mL every 4-6 hours as needed for congestion 473 mL 0    glucose blood VI test strips (BLOOD GLUCOSE TEST) strip Check blood sugar before breakfast and 2 hours after lunch (heaviest meal of day) 50 Strip 11    Blood-Glucose Meter (CONTOUR NEXT EZ METER) monitoring kit check 1 Kit 0    Lancets misc Use as directed checking glucose  Each 4    Blood-Glucose Meter monitoring kit Check blood sugar before breakfast and 2 hours after lunch (heaviest meal of day)  Reli on prefared 1 Kit 0     No current facility-administered medications on file prior to visit. Allergies   Allergen Reactions    Peanut Itching         Review of Systems:     Review of Systems   Constitutional: Negative for chills and fever. HENT: Negative for congestion and sore throat. Respiratory: Negative for cough and shortness of breath. Cardiovascular: Negative for chest pain and palpitations. Gastrointestinal: Negative for diarrhea, nausea and vomiting. Musculoskeletal: Positive for neck pain. Objective:     General: alert, cooperative, no distress   Mental  status: mental status: alert, oriented to person, place, and time, normal mood, behavior, speech, dress, motor activity, and thought processes   Resp: resp: normal effort and no respiratory distress   Neuro: neuro: no gross deficits   Skin: skin: no discoloration or lesions of concern on visible areas   Due to this being a TeleHealth evaluation, many elements of the physical examination are unable to be assessed. Assessment and Plan:     Throat/Neck pain: Located laterally around the inside of her throat with swallowing. Ddx include possibly PND due to allergies vs acid reflux vs less likely concerning for an infectious cause as symptoms are improving and she does not have any cough/nasal congestion/fever or other constitutional symptoms. Pt used to take flonase but ran out so will restart her flonase and also perform a trial of pepcid. - famotidine (PEPCID) 20 mg tablet; Take 1 Tab by mouth two (2) times a day. Dispense: 60 Tab; Refill: 1  - fluticasone propionate (FLONASE) 50 mcg/actuation nasal spray; 1 [place] by Both Nostrils route daily. Dispense: 1 Bottle;  Refill: 5  - Pt to make me aware if symptoms worsen or do not continue to improve, we discussed warning signs. Allergic rhinitis, unspecified seasonality, unspecified trigger: Currently only on low dose Zyrtec as it makes me sleepy. Will switch to Allegra. - fexofenadine (ALLEGRA) 180 mg tablet; Take 1 Tab by mouth daily. Dispense: 30 Tab; Refill: 5    Follow up: if symptoms do not improve    Vonnie Castleman, DO    Time spent in direct conversation with the patient to include medical condition(s) discussed, assessment and treatment plan:    We discussed the expected course, resolution and complications of the diagnosis(es) in detail. Medication risks, benefits, costs, interactions, and alternatives were discussed as indicated. I advised her to contact the office if her condition worsens, changes or fails to improve as anticipated. She expressed understanding with the diagnosis(es) and plan. Patient understands that this encounter was a temporary measure, and the importance of further follow up and examination was emphasized. Patient verbalized understanding. Patient informed to follow up: as needed. Electronically Signed: Vonnie Castleman, DO    Providers location when delivering service (clinic, hospital, home): Home     CPT Codes 09743-93044 for Established Patients may apply to this Telehealth Visit. POS code: 18.   Modifier GT

## 2021-10-11 DIAGNOSIS — I10 ESSENTIAL HYPERTENSION: ICD-10-CM

## 2021-10-14 RX ORDER — HYDROCHLOROTHIAZIDE 12.5 MG/1
CAPSULE ORAL
Qty: 90 CAPSULE | Refills: 0 | Status: SHIPPED | OUTPATIENT
Start: 2021-10-14 | End: 2021-12-27

## 2021-12-27 DIAGNOSIS — I10 ESSENTIAL HYPERTENSION: ICD-10-CM

## 2021-12-27 DIAGNOSIS — E11.9 TYPE 2 DIABETES MELLITUS WITHOUT COMPLICATION, WITHOUT LONG-TERM CURRENT USE OF INSULIN (HCC): ICD-10-CM

## 2021-12-27 RX ORDER — HYDROCHLOROTHIAZIDE 12.5 MG/1
CAPSULE ORAL
Qty: 30 CAPSULE | Refills: 0 | Status: SHIPPED | OUTPATIENT
Start: 2021-12-27 | End: 2022-04-17

## 2021-12-27 RX ORDER — METFORMIN HYDROCHLORIDE 500 MG/1
TABLET ORAL
Qty: 60 TABLET | Refills: 0 | Status: SHIPPED | OUTPATIENT
Start: 2021-12-27 | End: 2022-04-17

## 2022-03-19 PROBLEM — K21.9 GERD (GASTROESOPHAGEAL REFLUX DISEASE): Status: ACTIVE | Noted: 2018-12-10

## 2022-03-20 PROBLEM — E66.01 SEVERE OBESITY (HCC): Status: ACTIVE | Noted: 2018-12-10

## 2022-04-17 DIAGNOSIS — E11.9 TYPE 2 DIABETES MELLITUS WITHOUT COMPLICATION, WITHOUT LONG-TERM CURRENT USE OF INSULIN (HCC): ICD-10-CM

## 2022-04-17 DIAGNOSIS — I10 ESSENTIAL HYPERTENSION: ICD-10-CM

## 2022-04-17 RX ORDER — METFORMIN HYDROCHLORIDE 500 MG/1
TABLET ORAL
Qty: 60 TABLET | Refills: 0 | Status: SHIPPED | OUTPATIENT
Start: 2022-04-17 | End: 2022-04-26 | Stop reason: SDUPTHER

## 2022-04-17 RX ORDER — HYDROCHLOROTHIAZIDE 12.5 MG/1
CAPSULE ORAL
Qty: 30 CAPSULE | Refills: 0 | Status: SHIPPED | OUTPATIENT
Start: 2022-04-17 | End: 2022-04-26 | Stop reason: SDUPTHER

## 2022-04-25 NOTE — PROGRESS NOTES
Laura Crowley is an 41 y.o. female presents for medication refill for metformin and HCTZ. DM: On metformin 500mg BID - has never increased to 1000mg BID, but patient wishes to not increase it at this time in hopes she will be able to stay on the lower dose if her A1C is improved. Is walking more now. Checks feet regularly. Last eye check 6 months ago. Denies side effects from metformin. Lab Results   Component Value Date/Time    Hemoglobin A1c 7.4 (H) 08/07/2020 11:00 AM     HTN: on HCTZ 12.5mg daily. Does not check BP regularly. Lab Results   Component Value Date/Time    Sodium 136 08/07/2020 10:55 AM    Potassium 4.3 08/07/2020 10:55 AM    Chloride 102 08/07/2020 10:55 AM    CO2 28 08/07/2020 10:55 AM    Anion gap 6 08/07/2020 10:55 AM    Glucose 136 (H) 08/07/2020 10:55 AM    BUN 8 08/07/2020 10:55 AM    Creatinine 0.61 08/07/2020 10:55 AM    BUN/Creatinine ratio 13 08/07/2020 10:55 AM    GFR est AA >60 08/07/2020 10:55 AM    GFR est non-AA >60 08/07/2020 10:55 AM    Calcium 9.3 08/07/2020 10:55 AM       Review of Systems   Review of Systems   Constitutional: Negative for chills and fever. HENT: Negative for congestion. Respiratory: Negative for cough and shortness of breath. Cardiovascular: Negative for chest pain, palpitations and leg swelling. Gastrointestinal: Negative for abdominal pain, nausea and vomiting. Skin: Negative for rash. Allergies   Allergies   Allergen Reactions    Peanut Itching       Medications  Current Outpatient Medications   Medication Sig    hydroCHLOROthiazide (MICROZIDE) 12.5 mg capsule Take 1 Capsule by mouth daily.  metFORMIN (GLUCOPHAGE) 500 mg tablet Take 1 Tablet by mouth two (2) times daily (with meals).  fluticasone propionate (FLONASE) 50 mcg/actuation nasal spray 2 Sprays by Both Nostrils route daily.  cetirizine (ZYRTEC) 5 mg tablet Take 1 Tab by mouth daily.     glucose blood VI test strips (BLOOD GLUCOSE TEST) strip Check blood sugar before breakfast and 2 hours after lunch (heaviest meal of day)    Blood-Glucose Meter (CONTOUR NEXT EZ METER) monitoring kit check    Lancets misc Use as directed checking glucose BID    famotidine (PEPCID) 20 mg tablet Take 1 Tab by mouth two (2) times a day. (Patient not taking: Reported on 4/26/2022)    fexofenadine (ALLEGRA) 180 mg tablet Take 1 Tab by mouth daily. (Patient not taking: Reported on 4/26/2022)    fluticasone propionate (FLONASE) 50 mcg/actuation nasal spray 1 [place] by Both Nostrils route daily.  Blood-Glucose Meter monitoring kit Check blood sugar before breakfast and 2 hours after lunch (heaviest meal of day)  Reli on prefared     No current facility-administered medications for this visit. Medical History  Past Medical History:   Diagnosis Date    Allergic rhinitis     Diabetes mellitus ([address]     HTN (hypertension)     Diagnosed in ER in 1/2016 - on HCTZ - checking ambulatory BPs       Immunizations   Immunization History   Administered Date(s) Administered    COVID-19, Pfizer Purple top, DILUTE for use, 12+ yrs, 30mcg/0.3mL dose 07/05/2021, 07/26/2021, 01/13/2022       Objective   Vital Signs  Visit Vitals  /75   Pulse 81   Temp 97.5 °F (36.4 °C) (Temporal)   Resp 17   Ht 5' 5\" (1.651 m)   Wt 213 lb (96.6 kg)   SpO2 97%   BMI 35.45 kg/m²       Physical Examination  Physical Exam  Constitutional:       General: She is not in acute distress. Appearance: Normal appearance. HENT:      Head: Normocephalic and atraumatic. Eyes:      Conjunctiva/sclera: Conjunctivae normal.   Cardiovascular:      Rate and Rhythm: Normal rate and regular rhythm. Pulses: Normal pulses. Heart sounds: No murmur heard. Pulmonary:      Effort: Pulmonary effort is normal. No respiratory distress. Breath sounds: Normal breath sounds. Abdominal:      General: There is no distension. Palpations: Abdomen is soft. Tenderness: There is no abdominal tenderness. Musculoskeletal:      Right lower leg: No edema. Left lower leg: No edema. Skin:     General: Skin is warm. Findings: No erythema. Comments: Diabetic Foot Exam:  Left: Reflexes 2+     Vibratory sensation normal    Proprioception normal    Filament test normal sensation with micro filament   Pulse DP: 2+ (normal)   Pulse PT: 2+ (normal)   Deformities: no onychomycosis, no hammer toe, no bunion   Edema: none   Fat pad at sole of foot: adequate   Skin: no lesions, wounds   Motor: moves all extremities       Right: Reflexes 2+     Vibratory sensation normal    Proprioception normal    Filament test normal sensation with micro filament   Pulse DP: 2+ (normal)   Pulse PT: 2+ (normal)   Deformities: no onychomycosis, no hammer toe, no bunion   Edema: none   Fat pad at sole of foot: adequate   Skin: no lesions, wounds   Motor: moves all extremities    Neurological:      General: No focal deficit present. Mental Status: She is alert. Surekha Cox is a 41 y.o. who presents for follow up of diabetes, HTN. Plan   1. Primary hypertension BP at goal < 140/90. Tolerating HCTZ with no side effects. Continue HCTZ for now, if BP rises, consider ACEI/ARB. - keep BP log and follow up  - METABOLIC PANEL, BASIC; Future  - hydroCHLOROthiazide (MICROZIDE) 12.5 mg capsule; Take 1 Capsule by mouth daily. Dispense: 90 Capsule; Refill: 1    2. Type 2 diabetes mellitus without complication, without long-term current use of insulin ([facility] [address] Discussed that patient should be on statin given diabetes, but patient wishes to be on as few medicines as possible. Discussed that patient should be on max dose metformin given last A1C not at goal < 7 and has not had any side effects to warrant not increasing dose, but patient hopes her lifestyle measures have been enough to   - HEMOGLOBIN A1C WITH EAG;  Future  - MICROALBUMIN, UR, RAND W/ MICROALB/CREAT RATIO; Future  -  DIABETES FOOT EXAM  - metFORMIN (GLUCOPHAGE) 500 mg tablet; Take 1 Tablet by mouth two (2) times daily (with meals). Dispense: 180 Tablet; Refill: 1  - CBC W/O DIFF; Future  - REFERRAL TO DIABETIC EDUCATION  - MICROALBUMIN, UR, RAND W/ MICROALB/CREAT RATIO    3. Screening for lipid disorders  - LIPID PANEL; Future    4. Encounter for hepatitis C screening test for low risk patient  - HEPATITIS C AB; Future      Follow-up and Dispositions    · Return in about 2 weeks (around 5/10/2022) for BP follow up. I have discussed the aforementioned diagnoses and plan with the patient in detail. I have provided information in person and/or in AVS. All questions answered prior to discharge.       I discussed this patient with Dr. Liban Segovia (Attending Physician)   Signed By:  Te Wood DO    Family Medicine Resident

## 2022-04-26 ENCOUNTER — OFFICE VISIT (OUTPATIENT)
Dept: FAMILY MEDICINE CLINIC | Age: 47
End: 2022-04-26
Payer: MEDICAID

## 2022-04-26 VITALS
HEART RATE: 81 BPM | DIASTOLIC BLOOD PRESSURE: 75 MMHG | HEIGHT: 65 IN | WEIGHT: 213 LBS | OXYGEN SATURATION: 97 % | RESPIRATION RATE: 17 BRPM | BODY MASS INDEX: 35.49 KG/M2 | TEMPERATURE: 97.5 F | SYSTOLIC BLOOD PRESSURE: 133 MMHG

## 2022-04-26 DIAGNOSIS — Z13.220 SCREENING FOR LIPID DISORDERS: ICD-10-CM

## 2022-04-26 DIAGNOSIS — E11.9 TYPE 2 DIABETES MELLITUS WITHOUT COMPLICATION, WITHOUT LONG-TERM CURRENT USE OF INSULIN (HCC): ICD-10-CM

## 2022-04-26 DIAGNOSIS — I10 PRIMARY HYPERTENSION: ICD-10-CM

## 2022-04-26 DIAGNOSIS — Z11.59 ENCOUNTER FOR HEPATITIS C SCREENING TEST FOR LOW RISK PATIENT: ICD-10-CM

## 2022-04-26 DIAGNOSIS — I10 PRIMARY HYPERTENSION: Primary | ICD-10-CM

## 2022-04-26 PROCEDURE — 99214 OFFICE O/P EST MOD 30 MIN: CPT | Performed by: STUDENT IN AN ORGANIZED HEALTH CARE EDUCATION/TRAINING PROGRAM

## 2022-04-26 PROCEDURE — 3052F HG A1C>EQUAL 8.0%<EQUAL 9.0%: CPT | Performed by: STUDENT IN AN ORGANIZED HEALTH CARE EDUCATION/TRAINING PROGRAM

## 2022-04-26 RX ORDER — HYDROCHLOROTHIAZIDE 12.5 MG/1
12.5 CAPSULE ORAL DAILY
Qty: 90 CAPSULE | Refills: 1 | Status: SHIPPED | OUTPATIENT
Start: 2022-04-26

## 2022-04-26 RX ORDER — METFORMIN HYDROCHLORIDE 500 MG/1
500 TABLET ORAL 2 TIMES DAILY WITH MEALS
Qty: 180 TABLET | Refills: 1 | Status: SHIPPED | OUTPATIENT
Start: 2022-04-26 | End: 2022-04-28 | Stop reason: SDUPTHER

## 2022-04-26 NOTE — PROGRESS NOTES
Chief Complaint   Patient presents with    Diabetes    Hypertension    Medication Refill     1. Have you been to the ER, urgent care clinic since your last visit? Hospitalized since your last visit? No    2. Have you seen or consulted any other health care providers outside of the [address] [place] [address] since your last visit? Include any pap smears or colon screening.  No

## 2022-04-28 DIAGNOSIS — E11.9 TYPE 2 DIABETES MELLITUS WITHOUT COMPLICATION, WITHOUT LONG-TERM CURRENT USE OF INSULIN (HCC): ICD-10-CM

## 2022-04-28 LAB
ALBUMIN/CREAT UR: 3 MG/G CREAT (ref 0–29)
BUN SERPL-MCNC: 9 MG/DL (ref 6–24)
BUN/CREAT SERPL: 12 (ref 9–23)
CALCIUM SERPL-MCNC: 9.4 MG/DL (ref 8.7–10.2)
CHLORIDE SERPL-SCNC: 95 MMOL/L (ref 96–106)
CHOLEST SERPL-MCNC: 201 MG/DL (ref 100–199)
CO2 SERPL-SCNC: 26 MMOL/L (ref 20–29)
CREAT SERPL-MCNC: 0.74 MG/DL (ref 0.57–1)
CREAT UR-MCNC: 110.6 MG/DL
EGFR: 102 ML/MIN/1.73
ERYTHROCYTE [DISTWIDTH] IN BLOOD BY AUTOMATED COUNT: 13.5 % (ref 11.7–15.4)
EST. AVERAGE GLUCOSE BLD GHB EST-MCNC: 209 MG/DL
GLUCOSE SERPL-MCNC: 220 MG/DL (ref 65–99)
HBA1C MFR BLD: 8.9 % (ref 4.8–5.6)
HCT VFR BLD AUTO: 40.6 % (ref 34–46.6)
HCV AB S/CO SERPL IA: <0.1 S/CO RATIO (ref 0–0.9)
HDLC SERPL-MCNC: 53 MG/DL
HGB BLD-MCNC: 13 G/DL (ref 11.1–15.9)
IMP & REVIEW OF LAB RESULTS: NORMAL
LDLC SERPL CALC-MCNC: 126 MG/DL (ref 0–99)
MCH RBC QN AUTO: 28.8 PG (ref 26.6–33)
MCHC RBC AUTO-ENTMCNC: 32 G/DL (ref 31.5–35.7)
MCV RBC AUTO: 90 FL (ref 79–97)
MICROALBUMIN UR-MCNC: 3.8 UG/ML
PLATELET # BLD AUTO: 361 X10E3/UL (ref 150–450)
POTASSIUM SERPL-SCNC: 4.2 MMOL/L (ref 3.5–5.2)
RBC # BLD AUTO: 4.52 X10E6/UL (ref 3.77–5.28)
SODIUM SERPL-SCNC: 136 MMOL/L (ref 134–144)
TRIGL SERPL-MCNC: 123 MG/DL (ref 0–149)
VLDLC SERPL CALC-MCNC: 22 MG/DL (ref 5–40)
WBC # BLD AUTO: 10.7 X10E3/UL (ref 3.4–10.8)

## 2022-04-28 RX ORDER — METFORMIN HYDROCHLORIDE 1000 MG/1
1000 TABLET ORAL 2 TIMES DAILY WITH MEALS
Qty: 180 TABLET | Refills: 1 | Status: SHIPPED | OUTPATIENT
Start: 2022-04-28

## 2022-04-28 RX ORDER — ATORVASTATIN CALCIUM 10 MG/1
10 TABLET, FILM COATED ORAL DAILY
Qty: 90 TABLET | Refills: 1 | Status: SHIPPED | OUTPATIENT
Start: 2022-04-28

## 2022-04-28 NOTE — PROGRESS NOTES
CBC okay  BMP with hyperglycemia  Hep C Neg  Lipid panel with elevation of TC and LDL - ASCVD 7.0%, given diabetes, ADA recommends at least moderate intensity statin - will start atorvastatin 10mg daily  A1C 8.9 - above goal of 7 - will increase metformin up to 1000mg BID (patient has been resistant to taking more medicine in the past. Also discussed adding either GLP1 or SGLT2, but patient would rather work on her diet first and is willing to increase up metformin - will take 1000mg in AM, 500mg in PM x 1 week then titrate up to 1000mg BID. Patient is set up with Diabetes education as well. Plan to recheck A1C in 3 months. Called to discuss results and recommendations with patient - all questions answered.

## 2022-10-23 ENCOUNTER — HOSPITAL ENCOUNTER (EMERGENCY)
Age: 47
Discharge: HOME | End: 2022-10-23
Attending: STUDENT IN AN ORGANIZED HEALTH CARE EDUCATION/TRAINING PROGRAM
Payer: MEDICAID

## 2022-10-23 VITALS
DIASTOLIC BLOOD PRESSURE: 79 MMHG | TEMPERATURE: 97.9 F | SYSTOLIC BLOOD PRESSURE: 138 MMHG | WEIGHT: 208 LBS | RESPIRATION RATE: 18 BRPM | HEART RATE: 87 BPM | BODY MASS INDEX: 35.51 KG/M2 | OXYGEN SATURATION: 98 % | HEIGHT: 64 IN

## 2022-10-23 DIAGNOSIS — B34.9 VIRAL SYNDROME: Primary | ICD-10-CM

## 2022-10-23 LAB
COVID-19 RAPID TEST, COVR: NOT DETECTED
FLUAV AG NPH QL IA: NEGATIVE
FLUBV AG NOSE QL IA: NEGATIVE
SOURCE, COVRS: NORMAL

## 2022-10-23 PROCEDURE — 74011250637 HC RX REV CODE- 250/637: Performed by: STUDENT IN AN ORGANIZED HEALTH CARE EDUCATION/TRAINING PROGRAM

## 2022-10-23 PROCEDURE — 87635 SARS-COV-2 COVID-19 AMP PRB: CPT

## 2022-10-23 PROCEDURE — 87804 INFLUENZA ASSAY W/OPTIC: CPT

## 2022-10-23 PROCEDURE — 99283 EMERGENCY DEPT VISIT LOW MDM: CPT

## 2022-10-23 RX ORDER — IBUPROFEN 600 MG/1
600 TABLET ORAL ONCE
Status: COMPLETED | OUTPATIENT
Start: 2022-10-23 | End: 2022-10-23

## 2022-10-23 RX ADMIN — IBUPROFEN 600 MG: 600 TABLET, FILM COATED ORAL at 11:22

## 2022-10-23 NOTE — ED PROVIDER NOTES
SUMAN Aceves is a 41 y.o. female with past medical history notable for diabetes, hypertension presenting with fatigue for the past 2 days, generalized body aches which started yesterday, however denies upper respiratory symptoms, she has not had cough or vomiting. She does endorse nausea and had multiple episodes about 5 of diarrhea overnight, brownish, loose. She denies dysuria. No known sick contacts. She has not had a flu vaccine this year. Complaining of achy diffuse myalgias, constant. Has not take any medication for the symptom so far. Past Medical History:   Diagnosis Date    Allergic rhinitis     Diabetes mellitus ([facility] [address]     HTN (hypertension)     Diagnosed in ER in 1/2016 - on HCTZ - checking ambulatory BPs       History reviewed. No pertinent surgical history. Family History:   Problem Relation Age of Onset    Hypertension Mother     Diabetes Father     Hypertension Maternal Grandmother     Diabetes Brother        Social History     Socioeconomic History    Marital status: SINGLE     Spouse name: Not on file    Number of children: Not on file    Years of education: Not on file    Highest education level: Not on file   Occupational History    Not on file   Tobacco Use    Smoking status: Never    Smokeless tobacco: Never   Substance and Sexual Activity    Alcohol use: No    Drug use: No    Sexual activity: Not Currently     Partners: Male   Other Topics Concern    Not on file   Social History Narrative    Not on file     Social Determinants of Health     Financial Resource Strain: Not on file   Food Insecurity: Not on file   Transportation Needs: Not on file   Physical Activity: Not on file   Stress: Not on file   Social Connections: Not on file   Intimate Partner Violence: Not on file   Housing Stability: Not on file         ALLERGIES: Peanut    Review of Systems   Constitutional:  Positive for fatigue. Negative for chills and fever. Eyes:  Negative for photophobia.    Respiratory: Negative for shortness of breath. Cardiovascular:  Negative for chest pain. Gastrointestinal:  Negative for abdominal pain, nausea and vomiting. Genitourinary:  Negative for dysuria. Musculoskeletal:  Positive for myalgias. Negative for back pain. Neurological:  Positive for weakness. Negative for syncope, light-headedness and headaches. Psychiatric/Behavioral:  Negative for confusion. All other systems reviewed and are negative. Vitals:    10/23/22 1117 10/23/22 1132 10/23/22 1147 10/23/22 1202   BP: (!) 148/80 139/72 133/80 138/79   Pulse:       Resp:       Temp:       SpO2: 97% 96% 97% 98%   Weight:       Height:                Physical Exam  Constitutional:       General: She is not in acute distress. Appearance: She is not ill-appearing or toxic-appearing. HENT:      Head: Normocephalic and atraumatic. Nose: Nose normal.      Mouth/Throat:      Mouth: Mucous membranes are moist.   Eyes:      Extraocular Movements: Extraocular movements intact. Pupils: Pupils are equal, round, and reactive to light. Cardiovascular:      Rate and Rhythm: Normal rate and regular rhythm. Pulses: Normal pulses. Heart sounds: Normal heart sounds. Pulmonary:      Effort: Pulmonary effort is normal. No respiratory distress. Breath sounds: Normal breath sounds. No stridor. Abdominal:      General: Abdomen is flat. Palpations: Abdomen is soft. Tenderness: There is no abdominal tenderness. Musculoskeletal:         General: Normal range of motion. Cervical back: Normal range of motion. Right lower leg: No edema. Left lower leg: No edema. Skin:     General: Skin is warm. Capillary Refill: Capillary refill takes less than 2 seconds. Neurological:      General: No focal deficit present. Mental Status: She is alert and oriented to person, place, and time.    Psychiatric:         Mood and Affect: Mood normal.         Behavior: Behavior normal. MDM         Procedures    Patient with generalized fatigue and myalgias. Does not have focal source of infection by history or physical exam.  Appears well. Will screen for flu and COVID. No occasion for admission. Does not have indication for antibiotics at this time. PROGRESS NOTE:     The patient has been re-evaluated and are stable for discharge. All available radiology and laboratory results have been reviewed with patient and/or available family. Patient and/or family verbally conveyed their understanding and agreement of the patient's signs, symptoms, diagnosis, treatment and prognosis and additionally agree to follow-up as recommended in the discharge instructions or to return to the Emergency Department should their condition change or worsen prior to their follow-up appointment. All questions have been answered and patient and/or available family who express understanding. LABORATORY RESULTS:  Labs Reviewed   COVID-19 RAPID TEST   INFLUENZA A+B VIRAL AGS       IMAGING RESULTS:  No orders to display       MEDICATIONS GIVEN:  Medications   ibuprofen (MOTRIN) tablet 600 mg (600 mg Oral Given 10/23/22 1122)       IMPRESSION:  1. Viral syndrome        PLAN:  Follow-up Information       Follow up With Specialties Details Why Contact Info    Rosalio Wagner MD Family Medicine Schedule an appointment as soon as possible for a visit  Call for a follow up appointment. [address]   [phone]             Discharge Medication List as of 10/23/2022 12:07 PM            Matthew Bruce MD      Please note that this dictation was completed with [employer], the computer voice recognition software. Quite often unanticipated grammatical, syntax, homophones, and other interpretive errors are inadvertently transcribed by the computer software. Please disregard these errors. Please excuse any errors that have escaped final proofreading.

## 2022-10-23 NOTE — ED TRIAGE NOTES
Pt comes in ambulatory in no signs of distress. Pt has complaints of body aches and fatigue x1 and some diarrhea last night. Denies Fever, nausea and vomiting. Not had flu shot this season.

## 2022-10-23 NOTE — Clinical Note
[address]  [facility] EMERGENCY DEPT  [address] [id number]  [phone]    Work/School Note    Date: 10/23/2022    To Whom It May concern:    Mickie Dias was seen and treated today in the emergency room by the following provider(s):  Attending Provider: Kamla Goncalves MD.      Mickie Dias is excused from work/school on 10/23/2022 through 10/25/2022. She is medically clear to return to work/school on 10/26/2022.          Sincerely,          Lisa Dewey MD

## 2022-10-23 NOTE — Clinical Note
[address]  [facility] EMERGENCY DEPT  [address] [id number] [phone]    Work/School Note    Date: 10/23/2022    To Whom It May concern:    Quique Adams was seen and treated today in the emergency room by the following provider(s):  Attending Provider: Libertad Hopkins MD.      Quique Adams is excused from work/school on 10/23/2022 through 10/25/2022. She is medically clear to return to work/school on 10/26/2022.          Sincerely,          Kiko Brito MD

## 2023-03-07 DIAGNOSIS — J30.9 ALLERGIC RHINITIS, UNSPECIFIED SEASONALITY, UNSPECIFIED TRIGGER: ICD-10-CM

## 2023-03-07 DIAGNOSIS — I10 PRIMARY HYPERTENSION: ICD-10-CM

## 2023-03-08 RX ORDER — HYDROCHLOROTHIAZIDE 12.5 MG/1
CAPSULE ORAL
Qty: 90 CAPSULE | Refills: 1 | Status: SHIPPED | OUTPATIENT
Start: 2023-03-08

## 2023-04-03 RX ORDER — FLUTICASONE PROPIONATE 50 MCG
SPRAY, SUSPENSION (ML) NASAL
Qty: 16 G | Refills: 3 | Status: SHIPPED | OUTPATIENT
Start: 2023-04-03

## 2024-01-09 ENCOUNTER — HOSPITAL ENCOUNTER (EMERGENCY)
Facility: HOSPITAL | Age: 49
Discharge: HOME | End: 2024-01-09
Attending: EMERGENCY MEDICINE
Payer: MEDICAID

## 2024-01-09 VITALS
WEIGHT: 213 LBS | TEMPERATURE: 97.7 F | DIASTOLIC BLOOD PRESSURE: 88 MMHG | BODY MASS INDEX: 35.49 KG/M2 | OXYGEN SATURATION: 98 % | HEART RATE: 88 BPM | SYSTOLIC BLOOD PRESSURE: 143 MMHG | RESPIRATION RATE: 16 BRPM | HEIGHT: 65 IN
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
## 2024-01-09 DIAGNOSIS — S80.862A NONVENOMOUS INSECT BITE OF LEFT LOWER EXTREMITY, INITIAL ENCOUNTER: Primary | ICD-10-CM

## 2024-01-09 PROCEDURE — 99284 EMERGENCY DEPT VISIT MOD MDM: CPT

## 2024-01-09 PROCEDURE — 6360000002 HC RX W HCPCS: Performed by: STUDENT IN AN ORGANIZED HEALTH CARE EDUCATION/TRAINING PROGRAM

## 2024-01-09 PROCEDURE — 90714 TD VACC NO PRESV 7 YRS+ IM: CPT | Performed by: STUDENT IN AN ORGANIZED HEALTH CARE EDUCATION/TRAINING PROGRAM

## 2024-01-09 PROCEDURE — 90471 IMMUNIZATION ADMIN: CPT | Performed by: STUDENT IN AN ORGANIZED HEALTH CARE EDUCATION/TRAINING PROGRAM

## 2024-01-09 RX ORDER — TETANUS AND DIPHTHERIA TOXOIDS ADSORBED 2; 2 [LF]/.5ML; [LF]/.5ML
0.5 INJECTION INTRAMUSCULAR
Status: COMPLETED | OUTPATIENT
Start: 2024-01-09 | End: 2024-01-09

## 2024-01-09 RX ORDER — DOXYCYCLINE HYCLATE 100 MG
100 TABLET ORAL 2 TIMES DAILY
Qty: 14 TABLET | Refills: 0 | Status: SHIPPED | OUTPATIENT
Start: 2024-01-09 | End: 2024-01-16

## 2024-01-09 RX ADMIN — TETANUS AND DIPHTHERIA TOXOIDS ADSORBED 0.5 ML: 2; 2 INJECTION INTRAMUSCULAR at 12:44

## 2024-01-09 ASSESSMENT — ENCOUNTER SYMPTOMS
COUGH: 0
SHORTNESS OF BREATH: 0
NAUSEA: 0
COLOR CHANGE: 1
EYE PAIN: 0
SORE THROAT: 0
DIARRHEA: 0
VOMITING: 0
ABDOMINAL PAIN: 0

## 2024-01-09 ASSESSMENT — LIFESTYLE VARIABLES
HOW MANY STANDARD DRINKS CONTAINING ALCOHOL DO YOU HAVE ON A TYPICAL DAY: PATIENT DOES NOT DRINK
HOW OFTEN DO YOU HAVE A DRINK CONTAINING ALCOHOL: NEVER

## 2024-01-09 ASSESSMENT — PAIN DESCRIPTION - ORIENTATION: ORIENTATION: LEFT

## 2024-01-09 ASSESSMENT — PAIN - FUNCTIONAL ASSESSMENT: PAIN_FUNCTIONAL_ASSESSMENT: 0-10

## 2024-01-09 ASSESSMENT — PAIN DESCRIPTION - LOCATION: LOCATION: LEG

## 2024-01-09 ASSESSMENT — PAIN SCALES - GENERAL: PAINLEVEL_OUTOF10: 8

## 2024-01-09 NOTE — DISCHARGE INSTRUCTIONS
Continue to monitor symptoms at home.  Take Advil or Tylenol as needed for pain.  Follow-up with PCP and return with any changes or worsening.

## 2024-01-09 NOTE — ED PROVIDER NOTES
Rate and Rhythm: Normal rate and regular rhythm.      Heart sounds: Normal heart sounds.   Pulmonary:      Effort: Pulmonary effort is normal.      Breath sounds: Normal breath sounds.   Abdominal:      General: Bowel sounds are normal.      Palpations: Abdomen is soft.      Tenderness: There is no abdominal tenderness.   Skin:     General: Skin is warm and dry.      Findings: Abscess (Small, 1 cm round area of erythema, tenderness to palpation and fluctuance consistent with possible abscess with small amount of surrounding erythema) present.   Neurological:      General: No focal deficit present.      Mental Status: She is alert and oriented to person, place, and time.   Psychiatric:         Mood and Affect: Mood normal.         Behavior: Behavior normal.         Thought Content: Thought content normal.         DIAGNOSTIC RESULTS     EKG: All EKG's are interpreted by the Emergency Department Physician who either signs or Co-signs this chart in the absence of a cardiologist.        RADIOLOGY:   Non-plain film images such as CT, Ultrasound and MRI are read by the radiologist. Plain radiographic images are visualized and preliminarily interpreted by the emergency physician with the below findings:        Interpretation per the Radiologist below, if available at the time of this note:    No orders to display        LABS:  Labs Reviewed - No data to display    All other labs were within normal range or not returned as of this dictation.    EMERGENCY DEPARTMENT COURSE and DIFFERENTIAL DIAGNOSIS/MDM:   Vitals:    Vitals:    01/09/24 1220   BP: (!) 143/88   Pulse: 88   Resp: 16   Temp: 97.7 °F (36.5 °C)   TempSrc: Oral   SpO2: 98%   Weight: 96.6 kg (213 lb)   Height: 1.651 m (5' 5\")           Medical Decision Making  48-year-old female with no significant past medical history presents to ED with possible insect bite.  Vital signs stable in triage and patient afebrile.  Physical exam notable for small, 1 cm round area of

## 2024-01-09 NOTE — ED TRIAGE NOTES
Pt here with draining abscess to left outer calf that came up on Saturday. Pt has history of same, so she started an ABX that she had at home (Amox-Clav) for previous abscess. Pt has been using warm moist compresses to site.

## 2024-05-21 ENCOUNTER — OFFICE VISIT (OUTPATIENT)
Age: 49
End: 2024-05-21
Payer: MEDICAID

## 2024-05-21 VITALS
HEART RATE: 90 BPM | OXYGEN SATURATION: 97 % | RESPIRATION RATE: 18 BRPM | DIASTOLIC BLOOD PRESSURE: 91 MMHG | HEIGHT: 65 IN | SYSTOLIC BLOOD PRESSURE: 141 MMHG | BODY MASS INDEX: 35.49 KG/M2 | WEIGHT: 213 LBS

## 2024-05-21 DIAGNOSIS — Z12.31 ENCOUNTER FOR SCREENING MAMMOGRAM FOR MALIGNANT NEOPLASM OF BREAST: ICD-10-CM

## 2024-05-21 DIAGNOSIS — J30.1 SEASONAL ALLERGIC RHINITIS DUE TO POLLEN: ICD-10-CM

## 2024-05-21 DIAGNOSIS — N95.1 PERIMENOPAUSE: ICD-10-CM

## 2024-05-21 DIAGNOSIS — I10 PRIMARY HYPERTENSION: ICD-10-CM

## 2024-05-21 DIAGNOSIS — E11.9 TYPE 2 DIABETES MELLITUS WITHOUT COMPLICATION, WITHOUT LONG-TERM CURRENT USE OF INSULIN (HCC): Primary | ICD-10-CM

## 2024-05-21 DIAGNOSIS — E78.2 MIXED HYPERLIPIDEMIA: ICD-10-CM

## 2024-05-21 PROCEDURE — 3077F SYST BP >= 140 MM HG: CPT

## 2024-05-21 PROCEDURE — 99214 OFFICE O/P EST MOD 30 MIN: CPT

## 2024-05-21 PROCEDURE — 3080F DIAST BP >= 90 MM HG: CPT

## 2024-05-21 RX ORDER — FLUTICASONE PROPIONATE 50 MCG
2 SPRAY, SUSPENSION (ML) NASAL DAILY
Qty: 16 G | Refills: 1 | Status: SHIPPED | OUTPATIENT
Start: 2024-05-21

## 2024-05-21 RX ORDER — ATORVASTATIN CALCIUM 10 MG/1
10 TABLET, FILM COATED ORAL DAILY
Qty: 90 TABLET | Refills: 0 | Status: SHIPPED | OUTPATIENT
Start: 2024-05-21

## 2024-05-21 SDOH — ECONOMIC STABILITY: FOOD INSECURITY: WITHIN THE PAST 12 MONTHS, THE FOOD YOU BOUGHT JUST DIDN'T LAST AND YOU DIDN'T HAVE MONEY TO GET MORE.: SOMETIMES TRUE

## 2024-05-21 SDOH — ECONOMIC STABILITY: FOOD INSECURITY: WITHIN THE PAST 12 MONTHS, YOU WORRIED THAT YOUR FOOD WOULD RUN OUT BEFORE YOU GOT MONEY TO BUY MORE.: SOMETIMES TRUE

## 2024-05-21 SDOH — ECONOMIC STABILITY: HOUSING INSECURITY
IN THE LAST 12 MONTHS, WAS THERE A TIME WHEN YOU DID NOT HAVE A STEADY PLACE TO SLEEP OR SLEPT IN A SHELTER (INCLUDING NOW)?: NO

## 2024-05-21 SDOH — ECONOMIC STABILITY: INCOME INSECURITY: HOW HARD IS IT FOR YOU TO PAY FOR THE VERY BASICS LIKE FOOD, HOUSING, MEDICAL CARE, AND HEATING?: SOMEWHAT HARD

## 2024-05-21 ASSESSMENT — ENCOUNTER SYMPTOMS
DIARRHEA: 0
VOMITING: 0
NAUSEA: 0

## 2024-05-21 ASSESSMENT — PATIENT HEALTH QUESTIONNAIRE - PHQ9
2. FEELING DOWN, DEPRESSED OR HOPELESS: NOT AT ALL
SUM OF ALL RESPONSES TO PHQ QUESTIONS 1-9: 0
1. LITTLE INTEREST OR PLEASURE IN DOING THINGS: NOT AT ALL
SUM OF ALL RESPONSES TO PHQ9 QUESTIONS 1 & 2: 0
SUM OF ALL RESPONSES TO PHQ QUESTIONS 1-9: 0

## 2024-05-21 NOTE — PROGRESS NOTES
[facility] Medicine Res[facility]   [address] [place] [address]   Office [phone], Fax [phone]      Subjective   David Mercado is a 49 y.o. female who presents for Follow-up Chronic Condition, Menstrual Problem (Discuss possible pre menopause symptoms; experiencing night sweats, severe cramping during menstrual cycles and states she didn't have a menstrual cycle January through March, but menstrual cycle restarted in May /Started Menses at age 15 ), Diabetes, and Hypertension      HPI    #1 - T2DM  Hemoglobin A1C   Date Value Ref Range Status   04/26/2022 8.9 (H) 4.8 - 5.6 % Final     Comment:              Prediabetes: 5.7 - 6.4           Diabetes: >6.4           Glycemic control for adults with diabetes: <7.0     - Has been taking metformin 500mg BID      #2 - HTN   - on HCTZ 12.5mg daily, has not taken it today  - Has not been checking her blood pressure       #3 - HLD  Lab Results   Component Value Date    CHOL 201 (H) 04/26/2022    TRIG 123 04/26/2022    HDL 53 04/26/2022     (H) 04/26/2022    VLDL 22 04/26/2022    CHOLHDLRATIO 3.5 08/07/2020   - On lipitor 10mg daily, ran out recently so has not been taking      #4 - Perimenopause  - Did not have a cycle between January-March   - Cycles returned and have been heavier and more painful   - Did not take any medication for pain    Review of Systems   Review of Systems   Gastrointestinal:  Negative for diarrhea, nausea and vomiting.   Genitourinary:  Positive for vaginal bleeding.   Psychiatric/Behavioral:  Negative for confusion and decreased concentration.        Medical History  Past Medical History:   Diagnosis Date    Allergic rhinitis     Diabetes mellitus (HCC)     HTN (hypertension)     Diagnosed in ER in 1/2016 - on HCTZ - checking ambulatory BPs       Medications  Current Outpatient Medications   Medication Sig    fluticasone (FLONASE) 50 MCG/ACT nasal spray 2 sprays by Nasal route daily    atorvastatin (LIPITOR)

## 2024-05-21 NOTE — PROGRESS NOTES
Room 7    Identified pt with two pt identifiers(name and ). Reviewed record in preparation for visit and have obtained necessary documentation.    Chief Complaint   Patient presents with    Follow-up Chronic Condition     Diabetes. Denies questions or concerns    Menstrual Problem     Discuss possible pre menopause; experience night sweats, severe cramping and didn't have a menstrual cycle January through March  Started Menses at age 15         Health Maintenance Due   Topic    Hepatitis B vaccine (1 of 3 - 3-dose series)    Pneumococcal 0-64 years Vaccine (1 of 2 - PCV)    Depression Screen     HIV screen     Diabetic retinal exam     Cervical cancer screen     Colorectal Cancer Screen     Diabetic foot exam     A1C test (Diabetic or Prediabetic)     Diabetic Alb to Cr ratio (uACR) test     Lipids     GFR test (Diabetes, CKD 3-4, OR last GFR 15-59)     COVID-19 Vaccine ( season)    DTaP/Tdap/Td vaccine (1 - Tdap)       Vitals:    24 1600   BP: (!) 141/91   Site: Right Upper Arm   Position: Sitting   Cuff Size: Large Adult   Pulse: 90   Resp: 18   SpO2: 97%   Weight: 96.6 kg (213 lb)   Height: 1.651 m (5' 5\")         \"Have you been to the ER, urgent care clinic since your last visit?  Hospitalized since your last visit?\"    NO    “Have you seen or consulted any other health care providers outside of [facility] since your last visit?”    NO     “Have you had a pap smear?”    NO    Date of last Cervical Cancer screen (HPV or PAP): 3/24/2017     “Have you had a colorectal cancer screening such as a colonoscopy/FIT/Cologuard?    NO    No colonoscopy on file  No cologuard on file  No FIT/FOBT on file   No flexible sigmoidoscopy on file       Click Here for Release of Records Request     This patient is accompanied in the office by her self.  I have received verbal consent from David Mercado to discuss any/all medical information while they are present in the room.

## 2024-05-21 NOTE — PATIENT INSTRUCTIONS
understanding a bill, finding out what insurance pays, applying for financial aid, or setting up a payment plan.  Website:  [email or web address]  Financial Counseling Call Center: [phone]    [facility]  What they offer:   Mobile healthcare resources for uninsured patients.  Contact: [phone]        [facility]  What they offer:  Healthcare screenings and vaccines.  Contact: [phone]        [employer] ([facility])  What they offer:  Mammograms and PAP smears regardless of ability to pay.  Contact: [phone]        [facility] Health Financial Assistance  What they offer: [facility] provides financial assistance to patients based on their income, assets, and needs. Assistance may also be provided in obtaining free or low-cost health insurance or arranging a manageable payment plan.  Website: [email or web address]  Assistance application can be downloaded from above website  Financial Counseling Call Center: [phone]          Medications    Good [employer]  What they offer: [employer] tracks prescription drug prices and provides free drug coupons for discounts on medications.  Website: https://www.[employer]/  [employer]  What they offer: [employer] offers free information on medications and healthcare cost savings programs including prescription assistance programs, coupons, and discount programs.  Website: https://www.needVIDDIX.org/  Helpline: 546.719.5709    [employer]ist  What they offer: Information about free and low-cost medicine programs.  Website: https://www.rxassist.org/    [facility] $4 Prescription Program  What they offer: Prescription Program includes up to a 30-day supply for $4 and a 90-day supply for $10 of some covered generic drugs at commonly prescribed dosages  Website: https://www.[employer]/cp/4-prescriptions/0400673Wqtgulpdw  [employer]  What they offer: Partnership with the

## 2024-05-22 RX ORDER — FLUTICASONE PROPIONATE 50 MCG
2 SPRAY, SUSPENSION (ML) NASAL DAILY
Qty: 48 G | OUTPATIENT
Start: 2024-05-22

## 2024-09-05 NOTE — PROGRESS NOTES
[facility]   [address] [place] [address]   Office [phone], Fax [phone]      Subjective:   David Mercado is an 49 y.o. female who presents for complete physical exam.    HPI: Doing well. No complaints.    Diet: tries not to eat out a lot, smaller portions  Exercise: going to start soon   Tobacco use: never  Alcohol use: once per month   Illicit drug use: never  Sleep: ok  Menses: regular, although have started skipping months rarely  Sexual activity: not currently sexually active    Health Maintenance   Topic Date Due    Pneumococcal 0-64 years Vaccine (1 of 2 - PCV) Never done    HIV screen  Never done    Diabetic retinal exam  Never done    Hepatitis B vaccine (1 of 3 - 19+ 3-dose series) Never done    Breast cancer screen  Never done    Cervical cancer screen  03/24/2022    Colorectal Cancer Screen  Never done    A1C test (Diabetic or Prediabetic)  04/26/2023    Diabetic Alb to Cr ratio (uACR) test  04/26/2023    Lipids  04/26/2023    GFR test (Diabetes, CKD 3-4, OR last GFR 15-59)  04/26/2023    COVID-19 Vaccine (4 - 2023-24 season) 09/01/2024    Diabetic foot exam  05/21/2025    Depression Screen  05/21/2025    DTaP/Tdap/Td vaccine (2 - Td or Tdap) 09/06/2034    Hepatitis C screen  Completed    Hepatitis A vaccine  Aged Out    Hib vaccine  Aged Out    Polio vaccine  Aged Out    Meningococcal (ACWY) vaccine  Aged Out     Immunizations, reviewed:   Immunization History   Administered Date(s) Administered    COVID-19, PFIZER PURPLE top, DILUTE for use, (age 12 y+), 30mcg/0.3mL 07/05/2021, 07/26/2021, 01/13/2022    TD 2LF, TDVAX, (age 7y+), IM, 0.5mL 01/09/2024    TDaP, ADACEL (age 10y-64y), BOOSTRIX (age 10y+), IM, 0.5mL 09/06/2024     Flu: Due  Tdap: UTD  Pneumovax (64yo or earlier if risks): Due    Health Maintenance, reviewed:  Colonoscopy: Due  Diabetes: Due,   Hemoglobin A1C   Date Value Ref Range Status   04/26/2022 8.9 (H) 4.8 - 5.6 % Final      response to care/treatments: response to care/treatments:

## 2024-09-06 ENCOUNTER — OFFICE VISIT (OUTPATIENT)
Age: 49
End: 2024-09-06
Payer: MEDICAID

## 2024-09-06 VITALS
HEART RATE: 87 BPM | BODY MASS INDEX: 34.49 KG/M2 | SYSTOLIC BLOOD PRESSURE: 142 MMHG | RESPIRATION RATE: 18 BRPM | OXYGEN SATURATION: 95 % | TEMPERATURE: 98.4 F | DIASTOLIC BLOOD PRESSURE: 83 MMHG | WEIGHT: 207 LBS | HEIGHT: 65 IN

## 2024-09-06 DIAGNOSIS — E11.65 TYPE 2 DIABETES MELLITUS WITH HYPERGLYCEMIA, WITHOUT LONG-TERM CURRENT USE OF INSULIN (HCC): Primary | ICD-10-CM

## 2024-09-06 DIAGNOSIS — N81.4 PROLAPSED UTERUS: ICD-10-CM

## 2024-09-06 DIAGNOSIS — Z12.11 COLON CANCER SCREENING: ICD-10-CM

## 2024-09-06 DIAGNOSIS — Z23 NEED FOR TDAP VACCINATION: ICD-10-CM

## 2024-09-06 DIAGNOSIS — E78.2 MIXED HYPERLIPIDEMIA: ICD-10-CM

## 2024-09-06 DIAGNOSIS — Z00.00 ANNUAL PHYSICAL EXAM: ICD-10-CM

## 2024-09-06 DIAGNOSIS — I10 PRIMARY HYPERTENSION: ICD-10-CM

## 2024-09-06 DIAGNOSIS — J30.1 SEASONAL ALLERGIC RHINITIS DUE TO POLLEN: ICD-10-CM

## 2024-09-06 PROCEDURE — 99396 PREV VISIT EST AGE 40-64: CPT

## 2024-09-06 PROCEDURE — 90471 IMMUNIZATION ADMIN: CPT

## 2024-09-06 RX ORDER — FLUTICASONE PROPIONATE 50 MCG
2 SPRAY, SUSPENSION (ML) NASAL DAILY
Qty: 16 G | Refills: 1 | Status: SHIPPED | OUTPATIENT
Start: 2024-09-06

## 2024-09-06 NOTE — PATIENT INSTRUCTIONS
You are due for the PCV 20, flu, and COVID vaccines, which you can obtain at your local pharmacy.     Please see an eye doctor for your diabetic eye exam.

## 2024-09-06 NOTE — PROGRESS NOTES
I reviewed with the resident the medical history and the resident's findings on the physical examination.  I discussed with the resident the patient's diagnosis and concur with the plan.     Candelaria Craig MD 9/6/2024

## 2024-09-06 NOTE — PROGRESS NOTES
Patient has been identified by name and .    Chief Complaint   Patient presents with    Diabetes     Pt reports to get labs done, Papsmear       Vitals:    24 1003 24 1014   BP: (!) 148/81 (!) 142/83   Site: Left Upper Arm Right Upper Arm   Position: Sitting Sitting   Cuff Size: Large Adult Large Adult   Pulse: 87 87   Resp: 18    Temp: 98.4 °F (36.9 °C)    TempSrc: Oral    SpO2: 95%    Weight: 93.9 kg (207 lb)    Height: 1.651 m (5' 5\")         \"Have you been to the ER, urgent care clinic since your last visit?  Hospitalized since your last visit?\"    NO    “Have you seen or consulted any other health care providers outside of [facility] since your last visit?”    NO    “Have you had a colorectal cancer screening such as a colonoscopy/FIT/Cologuard?    NO    No colonoscopy on file  No cologuard on file  No FIT/FOBT on file   No flexible sigmoidoscopy on file        Have you had a mammogram?”   NO Due for one    No breast cancer screening on file      “Have you had a pap smear?”    NO    Date of last Cervical Cancer screen (HPV or PAP): 3/24/2017

## 2024-09-07 LAB
ALBUMIN SERPL-MCNC: 3.6 G/DL (ref 3.5–5)
ALBUMIN/GLOB SERPL: 0.8 (ref 1.1–2.2)
ALP SERPL-CCNC: 125 U/L (ref 45–117)
ALT SERPL-CCNC: 21 U/L (ref 12–78)
ANION GAP SERPL CALC-SCNC: 4 MMOL/L (ref 2–12)
AST SERPL-CCNC: 11 U/L (ref 15–37)
BILIRUB DIRECT SERPL-MCNC: <0.1 MG/DL (ref 0–0.2)
BILIRUB SERPL-MCNC: 0.4 MG/DL (ref 0.2–1)
BUN SERPL-MCNC: 8 MG/DL (ref 6–20)
BUN/CREAT SERPL: 14 (ref 12–20)
CALCIUM SERPL-MCNC: 9.5 MG/DL (ref 8.5–10.1)
CHLORIDE SERPL-SCNC: 101 MMOL/L (ref 97–108)
CO2 SERPL-SCNC: 29 MMOL/L (ref 21–32)
COMMENT:: NORMAL
CREAT SERPL-MCNC: 0.59 MG/DL (ref 0.55–1.02)
EST. AVERAGE GLUCOSE BLD GHB EST-MCNC: 252 MG/DL
GLOBULIN SER CALC-MCNC: 4.7 G/DL (ref 2–4)
GLUCOSE SERPL-MCNC: 230 MG/DL (ref 65–100)
HBA1C MFR BLD: 10.4 % (ref 4–5.6)
HBV SURFACE AB SER QL: NONREACTIVE
HBV SURFACE AB SER-ACNC: 8.42 MIU/ML
HIV 1+2 AB+HIV1 P24 AG SERPL QL IA: NONREACTIVE
HIV 1/2 RESULT COMMENT: NORMAL
POTASSIUM SERPL-SCNC: 3.9 MMOL/L (ref 3.5–5.1)
PROT SERPL-MCNC: 8.3 G/DL (ref 6.4–8.2)
SODIUM SERPL-SCNC: 134 MMOL/L (ref 136–145)
SPECIMEN HOLD: NORMAL

## 2024-09-11 ENCOUNTER — TELEPHONE (OUTPATIENT)
Age: 49
End: 2024-09-11

## 2024-09-11 DIAGNOSIS — E78.2 MIXED HYPERLIPIDEMIA: ICD-10-CM

## 2024-09-11 DIAGNOSIS — E11.9 TYPE 2 DIABETES MELLITUS WITHOUT COMPLICATION, WITHOUT LONG-TERM CURRENT USE OF INSULIN (HCC): Primary | ICD-10-CM

## 2024-09-11 DIAGNOSIS — I10 PRIMARY HYPERTENSION: ICD-10-CM

## 2024-09-13 LAB
CYTOLOGIST CVX/VAG CYTO: NORMAL
CYTOLOGY CVX/VAG DOC CYTO: NORMAL
CYTOLOGY CVX/VAG DOC THIN PREP: NORMAL
DX ICD CODE: NORMAL
HPV GENOTYPE REFLEX: NORMAL
HPV I/H RISK 4 DNA CVX QL PROBE+SIG AMP: NEGATIVE
Lab: NORMAL
OTHER STN SPEC: NORMAL
PATHOLOGIST CVX/VAG CYTO: NORMAL
STAT OF ADQ CVX/VAG CYTO-IMP: NORMAL

## 2024-09-19 ENCOUNTER — TELEPHONE (OUTPATIENT)
Age: 49
End: 2024-09-19

## 2024-09-20 ENCOUNTER — OFFICE VISIT (OUTPATIENT)
Age: 49
End: 2024-09-20
Payer: MEDICAID

## 2024-09-20 VITALS
BODY MASS INDEX: 34.32 KG/M2 | SYSTOLIC BLOOD PRESSURE: 120 MMHG | OXYGEN SATURATION: 96 % | TEMPERATURE: 98.1 F | RESPIRATION RATE: 18 BRPM | DIASTOLIC BLOOD PRESSURE: 76 MMHG | HEIGHT: 65 IN | WEIGHT: 206 LBS | HEART RATE: 73 BPM

## 2024-09-20 DIAGNOSIS — L65.8 TRACTION ALOPECIA: Primary | ICD-10-CM

## 2024-09-20 DIAGNOSIS — Z11.1 TUBERCULOSIS SCREENING: ICD-10-CM

## 2024-09-20 PROCEDURE — 99213 OFFICE O/P EST LOW 20 MIN: CPT

## 2024-09-20 ASSESSMENT — PATIENT HEALTH QUESTIONNAIRE - PHQ9
SUM OF ALL RESPONSES TO PHQ QUESTIONS 1-9: 0
SUM OF ALL RESPONSES TO PHQ QUESTIONS 1-9: 0
SUM OF ALL RESPONSES TO PHQ9 QUESTIONS 1 & 2: 0
SUM OF ALL RESPONSES TO PHQ QUESTIONS 1-9: 0
1. LITTLE INTEREST OR PLEASURE IN DOING THINGS: NOT AT ALL
2. FEELING DOWN, DEPRESSED OR HOPELESS: NOT AT ALL
SUM OF ALL RESPONSES TO PHQ QUESTIONS 1-9: 0

## 2024-09-30 ENCOUNTER — TELEPHONE (OUTPATIENT)
Age: 49
End: 2024-09-30

## 2024-09-30 NOTE — TELEPHONE ENCOUNTER
Called [facility] on 09/24/2024 at 4:39pm. Anjana HOWARD at the [facility] pharmacy PA dept stated that they have not received any PA and Cover MY med ia third party. Anjana stated that she is gong to fax the PA form to us and we need to complete the PA and fax back to the fax number mentioned on there with supportive documents and it may take at least 24 hours for them to respond.   Our office fax number was provided to Anjana. Waiting on the fax.     Received the faxed PA form. Per the form Trulicity does not need a PA if Pt has a DX of T2DM.    Called Pt to check if she has received & picked up Trulicity from her pharmacy. No reply. Left voice message with call back number.

## 2024-10-01 DIAGNOSIS — I10 PRIMARY HYPERTENSION: Primary | ICD-10-CM

## 2024-10-01 RX ORDER — HYDROCHLOROTHIAZIDE 12.5 MG/1
12.5 CAPSULE ORAL EVERY MORNING
Qty: 90 CAPSULE | Refills: 1 | Status: SHIPPED | OUTPATIENT
Start: 2024-10-01

## 2024-10-01 NOTE — TELEPHONE ENCOUNTER
Called Pt to check if she has received Trulicity. Pt stated no. While on the phone, Pt requested a refill for HCTZ 12.5 MG    Medication Refill Request    Atroger Mercado is requesting a refill of the following medication(s):   Requested Prescriptions     Pending Prescriptions Disp Refills    hydroCHLOROthiazide 12.5 MG capsule 30 capsule      Sig: Take 1 capsule by mouth every morning        Listed PCP is Jayme Sinha MD   Last provider to prescribe medication: Dr. Trevino  Last Date of Medication Prescribed: 03/08/2023   Date of Last Office Visit at [facility]: 09/20/2024      FUTURE APPOINTMENT:   Future Appointments   Date Time Provider Department Center   10/7/2024  1:20 PM Jayme Sinha MD [facility] DEP       Please send refill to:    [id number] - [place], [place] - [address] [place] [address] [phone] - F [phone]  [address] [place] [id number]  Phone: [phone] Fax: [phone]      Please review request and approve or deny with recommendations.

## 2024-10-02 ENCOUNTER — TELEPHONE (OUTPATIENT)
Age: 49
End: 2024-10-02

## 2025-03-18 ENCOUNTER — ANCILLARY PROCEDURE (OUTPATIENT)
Facility: HOSPITAL | Age: 50
End: 2025-03-18
Attending: STUDENT IN AN ORGANIZED HEALTH CARE EDUCATION/TRAINING PROGRAM
Payer: MEDICAID

## 2025-03-18 ENCOUNTER — HOSPITAL ENCOUNTER (EMERGENCY)
Facility: HOSPITAL | Age: 50
Discharge: HOME | End: 2025-03-18
Attending: STUDENT IN AN ORGANIZED HEALTH CARE EDUCATION/TRAINING PROGRAM
Payer: MEDICAID

## 2025-03-18 VITALS
DIASTOLIC BLOOD PRESSURE: 96 MMHG | OXYGEN SATURATION: 98 % | TEMPERATURE: 98.1 F | BODY MASS INDEX: 34.66 KG/M2 | WEIGHT: 203 LBS | HEIGHT: 64 IN | RESPIRATION RATE: 16 BRPM | HEART RATE: 94 BPM | SYSTOLIC BLOOD PRESSURE: 175 MMHG

## 2025-03-18 DIAGNOSIS — L02.211 ABDOMINAL WALL ABSCESS: Primary | ICD-10-CM

## 2025-03-18 PROCEDURE — 99284 EMERGENCY DEPT VISIT MOD MDM: CPT

## 2025-03-18 RX ORDER — FLUCONAZOLE 150 MG/1
150 TABLET ORAL ONCE
Qty: 1 TABLET | Refills: 0 | Status: SHIPPED | OUTPATIENT
Start: 2025-03-18 | End: 2025-03-18

## 2025-03-18 RX ORDER — DOXYCYCLINE HYCLATE 100 MG
100 TABLET ORAL 2 TIMES DAILY
Qty: 14 TABLET | Refills: 0 | Status: SHIPPED | OUTPATIENT
Start: 2025-03-18 | End: 2025-03-25

## 2025-03-18 ASSESSMENT — PAIN DESCRIPTION - PAIN TYPE: TYPE: ACUTE PAIN

## 2025-03-18 ASSESSMENT — PAIN SCALES - GENERAL: PAINLEVEL_OUTOF10: 9

## 2025-03-18 ASSESSMENT — PAIN DESCRIPTION - LOCATION: LOCATION: ABDOMEN

## 2025-03-18 ASSESSMENT — LIFESTYLE VARIABLES
HOW OFTEN DO YOU HAVE A DRINK CONTAINING ALCOHOL: NEVER
HOW MANY STANDARD DRINKS CONTAINING ALCOHOL DO YOU HAVE ON A TYPICAL DAY: PATIENT DOES NOT DRINK

## 2025-03-18 ASSESSMENT — PAIN DESCRIPTION - DESCRIPTORS: DESCRIPTORS: SORE

## 2025-03-18 ASSESSMENT — PAIN - FUNCTIONAL ASSESSMENT: PAIN_FUNCTIONAL_ASSESSMENT: 0-10

## 2025-03-18 NOTE — DISCHARGE INSTRUCTIONS
You were evaluated in the ER for an abscess. Please keep the area surrounding the abscess clean and dry. You will be given a prescription for antibiotics, please take the antibiotics as directed for the full course of the medication. If the abscess progresses you may need to have the abscess incised and drained.    You may use ice packs as needed for pain relief.You may take 600mg ibuprofen every 6 hours or tylenol 650mg every 6 hours as needed for pain. If needed, you can alternate these medications so that you take one medication every 3 hours. For instance, at noon take ibuprofen, then at 3pm take tylenol, then at 6pm take ibuprofen.    Please schedule an appointment with your primary care physician as soon as possible for follow up re-evaluation.    Return to the Emergency Department if you experience fevers greater than 100.4F, increase in area of redness or swelling, increasing amount of discharge from the area, increased tenderness around the area, or any other concerning symptoms.    Thank you for choosing us for your care.

## 2025-03-18 NOTE — ED TRIAGE NOTES
Pt arrives with complaint of a spider bite on her abdomen that she noticed on Friday Pt is assuming it is a spider bite because she had one before Pt has been applying hot compresses without relief.

## 2025-03-20 NOTE — ED PROVIDER NOTES
[facility]  EMERGENCY DEPARTMENT ENCOUNTER      Pt Name: David Mercado  MRN: [id number]  Birthdate 1975  Date of evaluation: 3/18/2025  Provider: Britni Sharma MD    CHIEF COMPLAINT       Chief Complaint   Patient presents with    Wound Infection       ALLERGIES     Peanut-containing drug products    ENCOUNTER     HISTORY OF PRESENT ILLNESS    A 49-year-old female with a past medical history of hypertension arrived at the Emergency Department via private vehicle. The history was provided by the patient herself. She presents with a chief complaint of a spider bite, describing a localized, throbbing sensation at the site of a small abscess with surrounding cellulitis. The area is tender to touch with increased skin sensitivity. There is no drainage from the abscess, and the patient denies having a fever. There is no recent trauma or injury, though a minor skin injury or insect bite is possible.    PAST MEDICAL HISTORY    - Hypertension.    PHYSICAL EXAM    General: Awake and alert adult, in no acute distress, appears consistent with stated age.    Eyes: Conjunctiva clear, no scleral icterus.    HENT: Moist mucus membranes, normal voice without muffled speech.    Neck: Supple, FROM, no meningismus.    Cardiac: Good perfusion with brisk distal capillary refill.    Respiratory: No respiratory distress, no tachypnea, no stridor, able to speak in full fluent sentences.    Abdomen: No abdominal tenderness.  Small approximately 1 cm probable abscess with erythema and fluctuance surrounding by approximately 3x2cm area of cellulitis with mild erythema and induration  MS: No trauma.    Skin: Localized area of infection noted with a small abscess and surrounding cellulitis. Tender to touch with skin sensitivity. No drainage observed from the abscess. No pallor, cyanosis, or diaphoresis.    Back exam: Normal ROM.    Neurologic: No altered mental status, appropriately conversant and moving

## 2025-05-02 ENCOUNTER — HOSPITAL ENCOUNTER (EMERGENCY)
Facility: HOSPITAL | Age: 50
Discharge: HOME | End: 2025-05-02
Attending: STUDENT IN AN ORGANIZED HEALTH CARE EDUCATION/TRAINING PROGRAM
Payer: MEDICAID

## 2025-05-02 VITALS
RESPIRATION RATE: 14 BRPM | SYSTOLIC BLOOD PRESSURE: 164 MMHG | DIASTOLIC BLOOD PRESSURE: 85 MMHG | HEIGHT: 64 IN | BODY MASS INDEX: 35.85 KG/M2 | WEIGHT: 210 LBS | TEMPERATURE: 98.1 F | OXYGEN SATURATION: 97 % | HEART RATE: 78 BPM

## 2025-05-02 DIAGNOSIS — S29.012A STRAIN OF RHOMBOID MUSCLE, INITIAL ENCOUNTER: Primary | ICD-10-CM

## 2025-05-02 PROCEDURE — 96372 THER/PROPH/DIAG INJ SC/IM: CPT

## 2025-05-02 PROCEDURE — 99284 EMERGENCY DEPT VISIT MOD MDM: CPT

## 2025-05-02 PROCEDURE — 6360000002 HC RX W HCPCS

## 2025-05-02 RX ORDER — LIDOCAINE 4 G/G
1 PATCH TOPICAL DAILY
Qty: 7 PATCH | Refills: 0 | Status: SHIPPED | OUTPATIENT
Start: 2025-05-02 | End: 2025-05-09

## 2025-05-02 RX ORDER — KETOROLAC TROMETHAMINE 30 MG/ML
15 INJECTION, SOLUTION INTRAMUSCULAR; INTRAVENOUS ONCE
Status: COMPLETED | OUTPATIENT
Start: 2025-05-02 | End: 2025-05-02

## 2025-05-02 RX ORDER — METHOCARBAMOL 750 MG/1
750 TABLET, FILM COATED ORAL EVERY 6 HOURS PRN
Qty: 16 TABLET | Refills: 0 | Status: SHIPPED | OUTPATIENT
Start: 2025-05-02 | End: 2025-05-06

## 2025-05-02 RX ORDER — NAPROXEN 500 MG/1
500 TABLET ORAL 2 TIMES DAILY
Qty: 60 TABLET | Refills: 0 | Status: SHIPPED | OUTPATIENT
Start: 2025-05-02

## 2025-05-02 RX ADMIN — KETOROLAC TROMETHAMINE 15 MG: 30 INJECTION, SOLUTION INTRAMUSCULAR at 09:23

## 2025-05-02 ASSESSMENT — PAIN DESCRIPTION - LOCATION
LOCATION: NECK;BACK
LOCATION: NECK;BACK

## 2025-05-02 ASSESSMENT — PAIN DESCRIPTION - DESCRIPTORS
DESCRIPTORS: ACHING
DESCRIPTORS: ACHING

## 2025-05-02 ASSESSMENT — PAIN DESCRIPTION - PAIN TYPE: TYPE: ACUTE PAIN

## 2025-05-02 ASSESSMENT — PAIN - FUNCTIONAL ASSESSMENT: PAIN_FUNCTIONAL_ASSESSMENT: 0-10

## 2025-05-02 ASSESSMENT — PAIN SCALES - GENERAL: PAINLEVEL_OUTOF10: 8

## 2025-05-02 ASSESSMENT — ENCOUNTER SYMPTOMS: BACK PAIN: 1

## 2025-05-02 NOTE — ED TRIAGE NOTES
Pt to ER complaining of neck r/t midback pain that started yesterday sts after picking up a child from floor at work. Pt sts 8/10 pain. Took ibuprofen last night, none today.

## 2025-05-02 NOTE — ED PROVIDER NOTES
[facility] DEPART[facility]  EMERGENCY DEPARTMENT ENCOUNTER      Pt Name: David Mercado  MRN: [id number]  Birthdate 1975  Date of evaluation: 5/2/2025  Provider: Farnaz Deng PA-C    CHIEF COMPLAINT       Chief Complaint   Patient presents with    Neck Pain    Back Pain         HISTORY OF PRESENT ILLNESS   (Location/Symptom, Timing/Onset, Context/Setting, Quality, Duration, Modifying Factors, Severity)  Note limiting factors.   David Mercado is a 50 y.o. female with history of  has a past medical history of Allergic rhinitis, Diabetes mellitus (HCC), and HTN (hypertension). who presents from home to [place] ED with cc of right sided mid back pain occurring yesterday after picking up a child at work.  She reports she picked up the child and felt her back tighten up.  She went home and took ibuprofen and had some relief.  Today when she woke up she felt the pain had worsened.  No medication prior to arrival today.  Pain does not radiate.  No numbness or tingling of the upper extremities.  No other injuries            PCP: Jayme Sinha MD    There are no other complaints, changes or physical findings at this time.        The history is provided by the patient.         Review of External Medical Records:     Nursing Notes were reviewed.    REVIEW OF SYSTEMS    (2-9 systems for level 4, 10 or more for level 5)     Review of Systems   Musculoskeletal:  Positive for back pain.       Except as noted above the remainder of the review of systems was reviewed and negative.       PAST MEDICAL HISTORY     Past Medical History:   Diagnosis Date    Allergic rhinitis     Diabetes mellitus (HCC)     HTN (hypertension)     Diagnosed in ER in 1/2016 - on HCTZ - checking ambulatory BPs         SURGICAL HISTORY     No past surgical history on file.      CURRENT MEDICATIONS       Discharge Medication List as of 5/2/2025  9:17 AM        CONTINUE these medications which have NOT CHANGED    Details   hydroCHLOROthiazide 12.5  Discharge 05/02/2025 09:29:48 AM      PATIENT REFERRED TO:  Jayme Sinha MD  [address] [facility] [address] [phone]    Go in 3 days      [facility]  [address]  [facility] [address] [phone]  Go to   If symptoms worsen    [facility]  [address] [facility]  [address]  [facility] [address] [phone]  Go to   As needed      DISCHARGE MEDICATIONS:  Discharge Medication List as of 5/2/2025  9:17 AM        START taking these medications    Details   lidocaine 4 % external patch Place 1 patch onto the skin daily for 7 days, TransDERmal, DAILY Starting Fri 5/2/2025, Until Fri 5/9/2025, For 7 days, Disp-7 patch, R-0, Normal      methocarbamol (ROBAXIN-750) 750 MG tablet Take 1 tablet by mouth every 6 hours as needed (muscle spasms), Disp-16 tablet, R-0Normal      naproxen (NAPROSYN) 500 MG tablet Take 1 tablet by mouth 2 times daily, Disp-60 tablet, R-0Normal               (Please note that portions of this note were completed with a voice recognition program.  Efforts were made to edit the dictations but occasionally words are mis-transcribed.)    Farnaz Deng PA-C (electronically signed)  Emergency Attending Physician / Physician Assistant / Nurse Practitioner             Farnaz Deng PA-C  05/02/25 [id number]

## 2025-05-02 NOTE — DISCHARGE INSTRUCTIONS
You were seen today in the emergency department for mid back pain.  Your exam and history is most consistent with a rhomboid muscle strain.  Take the medication as prescribed to help with symptoms.  Follow-up with your primary care provider or orthopedics.  Return for new or worsening symptoms

## 2025-05-02 NOTE — ED NOTES
Pt given discharge instructions, 3 prescriptions, and pt education. Pt instructed to follow-up w PCP & ORTHO. Pt verbalizes understanding and all questions answered. Pt ambulatory out of ER unaccompanied with steady gait.